# Patient Record
Sex: FEMALE | URBAN - METROPOLITAN AREA
[De-identification: names, ages, dates, MRNs, and addresses within clinical notes are randomized per-mention and may not be internally consistent; named-entity substitution may affect disease eponyms.]

---

## 2017-07-11 ENCOUNTER — HISTORICAL (OUTPATIENT)
Dept: ADMINISTRATIVE | Facility: HOSPITAL | Age: 57
End: 2017-07-11

## 2017-07-11 LAB
COLOR STL: NORMAL
CONSISTENCY STL: NORMAL
HEMOCCULT SP1 STL QL: NEGATIVE
HEMOCCULT SP2 STL QL: NEGATIVE

## 2017-07-31 ENCOUNTER — HISTORICAL (OUTPATIENT)
Dept: INTERNAL MEDICINE | Facility: CLINIC | Age: 57
End: 2017-07-31

## 2017-07-31 LAB
ABS NEUT (OLG): 1.46 X10(3)/MCL (ref 2.1–9.2)
APPEARANCE, UA: CLEAR
BACTERIA #/AREA URNS AUTO: ABNORMAL /[HPF]
BASOPHILS NFR BLD MANUAL: 2 %
BILIRUB UR QL STRIP: NEGATIVE
BUN SERPL-MCNC: 10 MG/DL (ref 7–18)
CALCIUM SERPL-MCNC: 9.2 MG/DL (ref 8.5–10.1)
CHLORIDE SERPL-SCNC: 108 MMOL/L (ref 98–107)
CO2 SERPL-SCNC: 34 MMOL/L (ref 21–32)
COLOR UR: YELLOW
CREAT SERPL-MCNC: 1 MG/DL (ref 0.6–1.3)
CREAT UR-MCNC: 352 MG/DL
EOSINOPHIL NFR BLD MANUAL: 0 %
ERYTHROCYTE [DISTWIDTH] IN BLOOD BY AUTOMATED COUNT: 13.2 % (ref 11.5–14.5)
GLUCOSE (UA): NORMAL
GLUCOSE SERPL-MCNC: 93 MG/DL (ref 74–106)
GRANULOCYTES NFR BLD MANUAL: 24 % (ref 43–75)
HCT VFR BLD AUTO: 41.9 % (ref 35–46)
HGB BLD-MCNC: 14 GM/DL (ref 12–16)
HGB UR QL STRIP: 0.1 MG/DL
HYALINE CASTS #/AREA URNS LPF: ABNORMAL /[LPF]
KETONES UR QL STRIP: NEGATIVE
LEUKOCYTE ESTERASE UR QL STRIP: NEGATIVE
LYMPHOCYTES NFR BLD MANUAL: 2 %
LYMPHOCYTES NFR BLD MANUAL: 64 % (ref 20.5–51.1)
MCH RBC QN AUTO: 29.8 PG (ref 26–34)
MCHC RBC AUTO-ENTMCNC: 33.4 GM/DL (ref 31–37)
MCV RBC AUTO: 89.1 FL (ref 80–100)
MICROALBUMIN UR-MCNC: 19.8 MG/L (ref 0–19)
MICROALBUMIN/CREAT RATIO PNL UR: 5.6 MCG/MG CR (ref 0–29)
MONOCYTES NFR BLD MANUAL: 4 % (ref 2–9)
NEUTS BAND NFR BLD MANUAL: 4 % (ref 0–10)
NITRITE UR QL STRIP: NEGATIVE
PH UR STRIP: 6 [PH] (ref 4.5–8)
PLATELET # BLD AUTO: 143 X10(3)/MCL (ref 130–400)
PLATELET # BLD EST: ADEQUATE 10*3/UL
PMV BLD AUTO: 11.3 FL (ref 7.4–10.4)
POTASSIUM SERPL-SCNC: 3.6 MMOL/L (ref 3.5–5.1)
PROT UR QL STRIP: 30 MG/DL
RBC # BLD AUTO: 4.7 X10(6)/MCL (ref 4–5.2)
RBC #/AREA URNS AUTO: ABNORMAL /[HPF]
RBC MORPH BLD: NORMAL
SODIUM SERPL-SCNC: 144 MMOL/L (ref 136–145)
SP GR UR STRIP: 1.02 (ref 1–1.03)
SQUAMOUS #/AREA URNS LPF: >100 /[LPF]
TSH SERPL-ACNC: 0.55 MIU/L (ref 0.36–3.74)
UROBILINOGEN UR STRIP-ACNC: 2 MG/DL
WBC # SPEC AUTO: 4.5 X10(3)/MCL (ref 4.5–11)
WBC #/AREA URNS AUTO: ABNORMAL /HPF

## 2017-08-01 ENCOUNTER — HISTORICAL (OUTPATIENT)
Dept: INTERNAL MEDICINE | Facility: CLINIC | Age: 57
End: 2017-08-01

## 2017-10-11 ENCOUNTER — HISTORICAL (OUTPATIENT)
Dept: ADMINISTRATIVE | Facility: HOSPITAL | Age: 57
End: 2017-10-11

## 2017-10-11 LAB
ABS NEUT (OLG): 2.14 X10(3)/MCL (ref 2.1–9.2)
ABS NEUT (OLG): 2.24 X10(3)/MCL (ref 2.1–9.2)
ALBUMIN SERPL-MCNC: 3.4 GM/DL (ref 3.4–5)
ALBUMIN/GLOB SERPL: 1 RATIO (ref 1–2)
ALP SERPL-CCNC: 75 UNIT/L (ref 45–117)
ALT SERPL-CCNC: 18 UNIT/L (ref 12–78)
AST SERPL-CCNC: 26 UNIT/L (ref 15–37)
BASOPHILS # BLD AUTO: 0.01 X10(3)/MCL
BASOPHILS # BLD AUTO: 0.01 X10(3)/MCL
BASOPHILS NFR BLD AUTO: 0 % (ref 0–1)
BASOPHILS NFR BLD AUTO: 0 % (ref 0–1)
BILIRUB SERPL-MCNC: 0.3 MG/DL (ref 0.2–1)
BILIRUBIN DIRECT+TOT PNL SERPL-MCNC: 0.1 MG/DL
BILIRUBIN DIRECT+TOT PNL SERPL-MCNC: 0.2 MG/DL
BUN SERPL-MCNC: 11 MG/DL (ref 7–18)
CALCIUM SERPL-MCNC: 9.1 MG/DL (ref 8.5–10.1)
CD3+CD4+ CELLS # SPEC: 178 UNIT/L (ref 589–1505)
CD3+CD4+ CELLS NFR BLD: 9 % (ref 31–59)
CHLORIDE SERPL-SCNC: 105 MMOL/L (ref 98–107)
CO2 SERPL-SCNC: 30 MMOL/L (ref 21–32)
CREAT SERPL-MCNC: 1 MG/DL (ref 0.6–1.3)
EOSINOPHIL # BLD AUTO: 0.08 10*3/UL
EOSINOPHIL # BLD AUTO: 0.09 10*3/UL
EOSINOPHIL NFR BLD AUTO: 2 % (ref 0–5)
EOSINOPHIL NFR BLD AUTO: 2 % (ref 0–5)
ERYTHROCYTE [DISTWIDTH] IN BLOOD BY AUTOMATED COUNT: 13.3 % (ref 11.5–14.5)
ERYTHROCYTE [DISTWIDTH] IN BLOOD BY AUTOMATED COUNT: 13.3 % (ref 11.5–14.5)
GLOBULIN SER-MCNC: 5.1 GM/ML (ref 2.3–3.5)
GLUCOSE SERPL-MCNC: 82 MG/DL (ref 74–106)
HCT VFR BLD AUTO: 39.9 % (ref 35–46)
HCT VFR BLD AUTO: 40.3 % (ref 35–46)
HGB BLD-MCNC: 13.3 GM/DL (ref 12–16)
HGB BLD-MCNC: 13.4 GM/DL (ref 12–16)
HIGH RISK HPV 16 (PRECISION): NEGATIVE
HIGH RISK HPV 18/45 (PRECISION): NEGATIVE
IMM GRANULOCYTES # BLD AUTO: 0.01 10*3/UL
IMM GRANULOCYTES NFR BLD AUTO: 0 %
LYMPHOCYTES # BLD AUTO: 1.92 X10(3)/MCL
LYMPHOCYTES # BLD AUTO: 1.98 X10(3)/MCL
LYMPHOCYTES # BLD AUTO: 1974 UNIT/L (ref 1260–5520)
LYMPHOCYTES NFR BLD AUTO: 42 % (ref 15–40)
LYMPHOCYTES NFR BLD AUTO: 43 % (ref 15–40)
LYMPHOCYTES NFR LN MANUAL: 42 % (ref 28–48)
LYMPHOMA - T-CELL MARKERS SPEC-IMP: ABNORMAL
MCH RBC QN AUTO: 30.5 PG (ref 26–34)
MCH RBC QN AUTO: 30.5 PG (ref 26–34)
MCHC RBC AUTO-ENTMCNC: 33.3 GM/DL (ref 31–37)
MCHC RBC AUTO-ENTMCNC: 33.3 GM/DL (ref 31–37)
MCV RBC AUTO: 91.5 FL (ref 80–100)
MCV RBC AUTO: 91.8 FL (ref 80–100)
MONOCYTES # BLD AUTO: 0.34 X10(3)/MCL
MONOCYTES # BLD AUTO: 0.37 X10(3)/MCL
MONOCYTES NFR BLD AUTO: 8 % (ref 4–12)
MONOCYTES NFR BLD AUTO: 8 % (ref 4–12)
NEUTROPHILS # BLD AUTO: 2.14 X10(3)/MCL
NEUTROPHILS # BLD AUTO: 2.24 X10(3)/MCL
NEUTROPHILS NFR BLD AUTO: 48 X10(3)/MCL
NEUTROPHILS NFR BLD AUTO: 48 X10(3)/MCL
PAP RECOMMENDATION EXT: ABNORMAL
PAP SMEAR: ABNORMAL
PLATELET # BLD AUTO: 142 X10(3)/MCL (ref 130–400)
PLATELET # BLD AUTO: 147 X10(3)/MCL (ref 130–400)
PMV BLD AUTO: 11.5 FL (ref 7.4–10.4)
PMV BLD AUTO: 12 FL (ref 7.4–10.4)
POTASSIUM SERPL-SCNC: 3.8 MMOL/L (ref 3.5–5.1)
PROT SERPL-MCNC: 8.5 GM/DL (ref 6.4–8.2)
RBC # BLD AUTO: 4.36 X10(6)/MCL (ref 4–5.2)
RBC # BLD AUTO: 4.39 X10(6)/MCL (ref 4–5.2)
SODIUM SERPL-SCNC: 141 MMOL/L (ref 136–145)
WBC # BLD AUTO: 4700 /MM3 (ref 4500–11500)
WBC # SPEC AUTO: 4.5 X10(3)/MCL (ref 4.5–11)
WBC # SPEC AUTO: 4.7 X10(3)/MCL (ref 4.5–11)

## 2017-11-20 ENCOUNTER — HISTORICAL (OUTPATIENT)
Dept: ADMINISTRATIVE | Facility: HOSPITAL | Age: 57
End: 2017-11-20

## 2017-11-20 LAB
ABS NEUT (OLG): 1.25 X10(3)/MCL (ref 2.1–9.2)
ALBUMIN SERPL-MCNC: 3.7 GM/DL (ref 3.4–5)
ALBUMIN/GLOB SERPL: 1 RATIO (ref 1–2)
ALP SERPL-CCNC: 75 UNIT/L (ref 45–117)
ALT SERPL-CCNC: 20 UNIT/L (ref 12–78)
AST SERPL-CCNC: 24 UNIT/L (ref 15–37)
BASOPHILS # BLD AUTO: 0.02 X10(3)/MCL
BASOPHILS NFR BLD AUTO: 0 % (ref 0–1)
BILIRUB SERPL-MCNC: 0.5 MG/DL (ref 0.2–1)
BILIRUBIN DIRECT+TOT PNL SERPL-MCNC: 0.1 MG/DL
BILIRUBIN DIRECT+TOT PNL SERPL-MCNC: 0.4 MG/DL
BUN SERPL-MCNC: 10 MG/DL (ref 7–18)
CALCIUM SERPL-MCNC: 9.5 MG/DL (ref 8.5–10.1)
CHLORIDE SERPL-SCNC: 107 MMOL/L (ref 98–107)
CO2 SERPL-SCNC: 33 MMOL/L (ref 21–32)
CREAT SERPL-MCNC: 1 MG/DL (ref 0.6–1.3)
EOSINOPHIL # BLD AUTO: 0.03 X10(3)/MCL
EOSINOPHIL NFR BLD AUTO: 1 % (ref 0–5)
ERYTHROCYTE [DISTWIDTH] IN BLOOD BY AUTOMATED COUNT: 13.1 % (ref 11.5–14.5)
GLOBULIN SER-MCNC: 5.3 GM/ML (ref 2.3–3.5)
GLUCOSE SERPL-MCNC: 77 MG/DL (ref 74–106)
HCT VFR BLD AUTO: 42.9 % (ref 35–46)
HGB BLD-MCNC: 14.6 GM/DL (ref 12–16)
LYMPHOCYTES # BLD AUTO: 2.38 X10(3)/MCL
LYMPHOCYTES NFR BLD AUTO: 59 % (ref 15–40)
MCH RBC QN AUTO: 30.9 PG (ref 26–34)
MCHC RBC AUTO-ENTMCNC: 34 GM/DL (ref 31–37)
MCV RBC AUTO: 90.9 FL (ref 80–100)
MONOCYTES # BLD AUTO: 0.35 X10(3)/MCL
MONOCYTES NFR BLD AUTO: 9 % (ref 4–12)
NEUTROPHILS # BLD AUTO: 1.25 X10(3)/MCL
NEUTROPHILS NFR BLD AUTO: 31 X10(3)/MCL
PLATELET # BLD AUTO: 132 X10(3)/MCL (ref 130–400)
PMV BLD AUTO: 11.3 FL (ref 7.4–10.4)
POTASSIUM SERPL-SCNC: 4.2 MMOL/L (ref 3.5–5.1)
PROT SERPL-MCNC: 9 GM/DL (ref 6.4–8.2)
RBC # BLD AUTO: 4.72 X10(6)/MCL (ref 4–5.2)
SODIUM SERPL-SCNC: 142 MMOL/L (ref 136–145)
WBC # SPEC AUTO: 4 X10(3)/MCL (ref 4.5–11)

## 2018-01-12 LAB — POC BETA-HCG (QUAL): NEGATIVE

## 2018-06-19 ENCOUNTER — HISTORICAL (OUTPATIENT)
Dept: ADMINISTRATIVE | Facility: HOSPITAL | Age: 58
End: 2018-06-19

## 2018-06-19 LAB
ABS NEUT (OLG): 1.08 X10(3)/MCL (ref 2.1–9.2)
ABS NEUT (OLG): 1.14 X10(3)/MCL (ref 2.1–9.2)
ALBUMIN SERPL-MCNC: 3.4 GM/DL (ref 3.4–5)
ALBUMIN/GLOB SERPL: 1 RATIO (ref 1–2)
ALP SERPL-CCNC: 68 UNIT/L (ref 45–117)
ALT SERPL-CCNC: 24 UNIT/L (ref 12–78)
AST SERPL-CCNC: 26 UNIT/L (ref 15–37)
BASOPHILS NFR BLD MANUAL: 0 %
BASOPHILS NFR BLD MANUAL: 0 %
BILIRUB SERPL-MCNC: 0.3 MG/DL (ref 0.2–1)
BILIRUBIN DIRECT+TOT PNL SERPL-MCNC: <0.1
BILIRUBIN DIRECT+TOT PNL SERPL-MCNC: ABNORMAL MG/DL
BUN SERPL-MCNC: 10 MG/DL (ref 7–18)
CALCIUM SERPL-MCNC: 9.4 MG/DL (ref 8.5–10.1)
CD3+CD4+ CELLS # SPEC: 153 UNIT/L (ref 589–1505)
CD3+CD4+ CELLS NFR BLD: 10.1 % (ref 31–59)
CHLORIDE SERPL-SCNC: 108 MMOL/L (ref 98–107)
CHOLEST SERPL-MCNC: 153 MG/DL
CHOLEST/HDLC SERPL: 4.4 {RATIO} (ref 0–4.4)
CO2 SERPL-SCNC: 32 MMOL/L (ref 21–32)
CREAT SERPL-MCNC: 1.1 MG/DL (ref 0.6–1.3)
EOSINOPHIL NFR BLD MANUAL: 1 %
EOSINOPHIL NFR BLD MANUAL: 3 %
ERYTHROCYTE [DISTWIDTH] IN BLOOD BY AUTOMATED COUNT: 12.7 % (ref 11.5–14.5)
ERYTHROCYTE [DISTWIDTH] IN BLOOD BY AUTOMATED COUNT: 12.7 % (ref 11.5–14.5)
EST. AVERAGE GLUCOSE BLD GHB EST-MCNC: 128 MG/DL
GLOBULIN SER-MCNC: 4.6 GM/ML (ref 2.3–3.5)
GLUCOSE SERPL-MCNC: 145 MG/DL (ref 74–106)
GRANULOCYTES NFR BLD MANUAL: 33 % (ref 43–75)
GRANULOCYTES NFR BLD MANUAL: 40 % (ref 43–75)
HBA1C MFR BLD: 6.1 % (ref 4.2–6.3)
HCT VFR BLD AUTO: 43.8 % (ref 35–46)
HCT VFR BLD AUTO: 44.8 % (ref 35–46)
HDLC SERPL-MCNC: 35 MG/DL
HGB BLD-MCNC: 14.4 GM/DL (ref 12–16)
HGB BLD-MCNC: 14.4 GM/DL (ref 12–16)
LDLC SERPL CALC-MCNC: 84 MG/DL (ref 0–130)
LYMPHOCYTES # BLD AUTO: 1519 UNIT/L (ref 1260–5520)
LYMPHOCYTES NFR BLD MANUAL: 3 %
LYMPHOCYTES NFR BLD MANUAL: 4 %
LYMPHOCYTES NFR BLD MANUAL: 49 % (ref 20.5–51.1)
LYMPHOCYTES NFR BLD MANUAL: 56 % (ref 20.5–51.1)
LYMPHOCYTES NFR LN MANUAL: 49 % (ref 28–48)
LYMPHOMA - T-CELL MARKERS SPEC-IMP: ABNORMAL
MCH RBC QN AUTO: 29.8 PG (ref 26–34)
MCH RBC QN AUTO: 30.6 PG (ref 26–34)
MCHC RBC AUTO-ENTMCNC: 32.1 GM/DL (ref 31–37)
MCHC RBC AUTO-ENTMCNC: 32.9 GM/DL (ref 31–37)
MCV RBC AUTO: 92.6 FL (ref 80–100)
MCV RBC AUTO: 93 FL (ref 80–100)
MONOCYTES NFR BLD MANUAL: 5 % (ref 2–9)
MONOCYTES NFR BLD MANUAL: 6 % (ref 2–9)
PLATELET # BLD AUTO: 175 X10(3)/MCL (ref 130–400)
PLATELET # BLD AUTO: 176 X10(3)/MCL (ref 130–400)
PLATELET # BLD EST: ADEQUATE 10*3/UL
PLATELET # BLD EST: ADEQUATE 10*3/UL
PMV BLD AUTO: 11.6 FL (ref 7.4–10.4)
PMV BLD AUTO: 11.8 FL (ref 7.4–10.4)
POTASSIUM SERPL-SCNC: 3.9 MMOL/L (ref 3.5–5.1)
PROT SERPL-MCNC: 8 GM/DL (ref 6.4–8.2)
RBC # BLD AUTO: 4.71 X10(6)/MCL (ref 4–5.2)
RBC # BLD AUTO: 4.84 X10(6)/MCL (ref 4–5.2)
RBC MORPH BLD: NORMAL
RBC MORPH BLD: NORMAL
SODIUM SERPL-SCNC: 142 MMOL/L (ref 136–145)
TRIGL SERPL-MCNC: 171 MG/DL
TSH SERPL-ACNC: 0.73 MIU/L (ref 0.36–3.74)
VLDLC SERPL CALC-MCNC: 34 MG/DL
WBC # BLD AUTO: 3100 /MM3 (ref 4500–11500)
WBC # SPEC AUTO: 3.1 X10(3)/MCL (ref 4.5–11)
WBC # SPEC AUTO: 3.4 X10(3)/MCL (ref 4.5–11)

## 2018-10-17 LAB
COLOR STL: NORMAL
CONSISTENCY STL: NORMAL
HEMOCCULT SP1 STL QL: NEGATIVE

## 2018-10-18 LAB
COLOR STL: NORMAL
CONSISTENCY STL: NORMAL
HEMOCCULT SP2 STL QL: NEGATIVE

## 2018-10-19 ENCOUNTER — HISTORICAL (OUTPATIENT)
Dept: ADMINISTRATIVE | Facility: HOSPITAL | Age: 58
End: 2018-10-19

## 2018-10-19 ENCOUNTER — HISTORICAL (OUTPATIENT)
Dept: INTERNAL MEDICINE | Facility: CLINIC | Age: 58
End: 2018-10-19

## 2018-10-19 LAB
ABS NEUT (OLG): 1.47 X10(3)/MCL (ref 2.1–9.2)
ALBUMIN SERPL-MCNC: 3.4 GM/DL (ref 3.4–5)
ALBUMIN/GLOB SERPL: 1 RATIO (ref 1–2)
ALP SERPL-CCNC: 71 UNIT/L (ref 45–117)
ALT SERPL-CCNC: 25 UNIT/L (ref 12–78)
AST SERPL-CCNC: 30 UNIT/L (ref 15–37)
BASOPHILS # BLD AUTO: 0.02 X10(3)/MCL
BASOPHILS NFR BLD AUTO: 1 %
BILIRUB SERPL-MCNC: 0.3 MG/DL (ref 0.2–1)
BILIRUBIN DIRECT+TOT PNL SERPL-MCNC: 0.1 MG/DL
BILIRUBIN DIRECT+TOT PNL SERPL-MCNC: 0.2 MG/DL
BUN SERPL-MCNC: 8 MG/DL (ref 7–18)
CALCIUM SERPL-MCNC: 9.1 MG/DL (ref 8.5–10.1)
CD3+CD4+ CELLS # SPEC: 173 UNIT/L (ref 589–1505)
CD3+CD4+ CELLS NFR BLD: 10.1 % (ref 31–59)
CHLORIDE SERPL-SCNC: 108 MMOL/L (ref 98–107)
CO2 SERPL-SCNC: 32 MMOL/L (ref 21–32)
COLOR STL: NORMAL
CONSISTENCY STL: NORMAL
CREAT SERPL-MCNC: 0.9 MG/DL (ref 0.6–1.3)
EOSINOPHIL # BLD AUTO: 0.07 X10(3)/MCL
EOSINOPHIL NFR BLD AUTO: 2 %
ERYTHROCYTE [DISTWIDTH] IN BLOOD BY AUTOMATED COUNT: 13.3 % (ref 11.5–14.5)
GLOBULIN SER-MCNC: 5.3 GM/ML (ref 2.3–3.5)
GLUCOSE SERPL-MCNC: 78 MG/DL (ref 74–106)
HCT VFR BLD AUTO: 43.4 % (ref 35–46)
HGB BLD-MCNC: 14.4 GM/DL (ref 12–16)
LYMPHOCYTES # BLD AUTO: 1.69 X10(3)/MCL
LYMPHOCYTES # BLD AUTO: 1715 UNIT/L (ref 1260–5520)
LYMPHOCYTES NFR BLD AUTO: 49 % (ref 13–40)
LYMPHOCYTES NFR LN MANUAL: 49 % (ref 28–48)
LYMPHOMA - T-CELL MARKERS SPEC-IMP: ABNORMAL
MCH RBC QN AUTO: 30.4 PG (ref 26–34)
MCHC RBC AUTO-ENTMCNC: 33.2 GM/DL (ref 31–37)
MCV RBC AUTO: 91.8 FL (ref 80–100)
MONOCYTES # BLD AUTO: 0.23 X10(3)/MCL
MONOCYTES NFR BLD AUTO: 7 % (ref 4–12)
NEUTROPHILS # BLD AUTO: 1.47 X10(3)/MCL
NEUTROPHILS NFR BLD AUTO: 42 X10(3)/MCL
PLATELET # BLD AUTO: 129 X10(3)/MCL (ref 130–400)
PMV BLD AUTO: 11.4 FL (ref 7.4–10.4)
POTASSIUM SERPL-SCNC: 3.9 MMOL/L (ref 3.5–5.1)
PROT SERPL-MCNC: 8.7 GM/DL (ref 6.4–8.2)
RBC # BLD AUTO: 4.73 X10(6)/MCL (ref 4–5.2)
SODIUM SERPL-SCNC: 143 MMOL/L (ref 136–145)
WBC # BLD AUTO: 3500 /MM3 (ref 4500–11500)
WBC # SPEC AUTO: 3.5 X10(3)/MCL (ref 4.5–11)

## 2018-10-26 ENCOUNTER — HISTORICAL (OUTPATIENT)
Dept: RADIOLOGY | Facility: HOSPITAL | Age: 58
End: 2018-10-26

## 2019-04-15 ENCOUNTER — HISTORICAL (OUTPATIENT)
Dept: ADMINISTRATIVE | Facility: HOSPITAL | Age: 59
End: 2019-04-15

## 2019-04-15 LAB
ABS NEUT (OLG): 1.3 X10(3)/MCL (ref 2.1–9.2)
ALBUMIN SERPL-MCNC: 3.3 GM/DL (ref 3.4–5)
ALBUMIN/GLOB SERPL: 0.7 RATIO (ref 1.1–2)
ALP SERPL-CCNC: 64 UNIT/L (ref 45–117)
ALT SERPL-CCNC: 17 UNIT/L (ref 12–78)
AST SERPL-CCNC: 27 UNIT/L (ref 15–37)
BASOPHILS # BLD AUTO: 0.02 X10(3)/MCL
BASOPHILS NFR BLD AUTO: 1 %
BILIRUB SERPL-MCNC: 0.2 MG/DL (ref 0.2–1)
BILIRUBIN DIRECT+TOT PNL SERPL-MCNC: <0.1 MG/DL
BILIRUBIN DIRECT+TOT PNL SERPL-MCNC: ABNORMAL MG/DL
BUN SERPL-MCNC: 14 MG/DL (ref 7–18)
CALCIUM SERPL-MCNC: 9.3 MG/DL (ref 8.5–10.1)
CD3+CD4+ CELLS # SPEC: 115 UNIT/L (ref 589–1505)
CD3+CD4+ CELLS NFR BLD: 8.8 % (ref 31–59)
CHLORIDE SERPL-SCNC: 106 MMOL/L (ref 98–107)
CO2 SERPL-SCNC: 33 MMOL/L (ref 21–32)
CREAT SERPL-MCNC: 1 MG/DL (ref 0.6–1.3)
EOSINOPHIL # BLD AUTO: 0.24 X10(3)/MCL
EOSINOPHIL NFR BLD AUTO: 8 %
ERYTHROCYTE [DISTWIDTH] IN BLOOD BY AUTOMATED COUNT: 12.8 % (ref 11.5–14.5)
GLOBULIN SER-MCNC: 4.9 GM/ML (ref 2.3–3.5)
GLUCOSE SERPL-MCNC: 75 MG/DL (ref 74–106)
HCT VFR BLD AUTO: 40.5 % (ref 35–46)
HGB BLD-MCNC: 13.3 GM/DL (ref 12–16)
LYMPHOCYTES # BLD AUTO: 1.31 X10(3)/MCL
LYMPHOCYTES # BLD AUTO: 1302 UNIT/L (ref 1260–5520)
LYMPHOCYTES NFR BLD AUTO: 42 % (ref 13–40)
LYMPHOCYTES NFR LN MANUAL: 42 % (ref 28–48)
LYMPHOMA - T-CELL MARKERS SPEC-IMP: ABNORMAL
MCH RBC QN AUTO: 29.9 PG (ref 26–34)
MCHC RBC AUTO-ENTMCNC: 32.8 GM/DL (ref 31–37)
MCV RBC AUTO: 91 FL (ref 80–100)
MONOCYTES # BLD AUTO: 0.24 X10(3)/MCL
MONOCYTES NFR BLD AUTO: 8 % (ref 4–12)
NEUTROPHILS # BLD AUTO: 1.3 X10(3)/MCL
NEUTROPHILS NFR BLD AUTO: 42 X10(3)/MCL
PLATELET # BLD AUTO: 151 X10(3)/MCL (ref 130–400)
PMV BLD AUTO: 11.8 FL (ref 7.4–10.4)
POTASSIUM SERPL-SCNC: 3.8 MMOL/L (ref 3.5–5.1)
PROT SERPL-MCNC: 8.2 GM/DL (ref 6.4–8.2)
RBC # BLD AUTO: 4.45 X10(6)/MCL (ref 4–5.2)
SODIUM SERPL-SCNC: 141 MMOL/L (ref 136–145)
T PALLIDUM AB SER QL: NONREACTIVE
WBC # BLD AUTO: 3100 /MM3 (ref 4500–11500)
WBC # SPEC AUTO: 3.1 X10(3)/MCL (ref 4.5–11)

## 2019-04-16 LAB — GRAM STN SPEC: NORMAL

## 2019-06-05 ENCOUNTER — HISTORICAL (OUTPATIENT)
Dept: ADMINISTRATIVE | Facility: HOSPITAL | Age: 59
End: 2019-06-05

## 2019-06-05 LAB
ABS NEUT (OLG): 1.54 X10(3)/MCL (ref 2.1–9.2)
APPEARANCE, UA: CLEAR
BACTERIA #/AREA URNS AUTO: ABNORMAL /[HPF]
BASOPHILS # BLD AUTO: 0.02 X10(3)/MCL
BASOPHILS NFR BLD AUTO: 1 %
BILIRUB UR QL STRIP: NEGATIVE
CD3+CD4+ CELLS # SPEC: 127 UNIT/L (ref 589–1505)
CD3+CD4+ CELLS NFR BLD: 8.5 % (ref 31–59)
CHOLEST SERPL-MCNC: 157 MG/DL
CHOLEST/HDLC SERPL: 3.6 {RATIO} (ref 0–4.4)
COLOR UR: YELLOW
EOSINOPHIL # BLD AUTO: 0.04 10*3/UL
EOSINOPHIL NFR BLD AUTO: 1 %
ERYTHROCYTE [DISTWIDTH] IN BLOOD BY AUTOMATED COUNT: 13.2 % (ref 11.5–14.5)
EST. AVERAGE GLUCOSE BLD GHB EST-MCNC: 126 MG/DL
GLUCOSE (UA): NORMAL
HAV AB SER QL IA: REACTIVE
HAV IGM SERPL QL IA: NONREACTIVE
HBA1C MFR BLD: 6 % (ref 4.2–6.3)
HBV CORE IGM SERPL QL IA: NONREACTIVE
HBV SURFACE AB SER-ACNC: <3.1 MIU/ML
HBV SURFACE AB SERPL IA-ACNC: NONREACTIVE M[IU]/ML
HBV SURFACE AG SERPL QL IA: NEGATIVE
HCT VFR BLD AUTO: 42.5 % (ref 35–46)
HCV AB SERPL QL IA: NONREACTIVE
HDLC SERPL-MCNC: 44 MG/DL
HGB BLD-MCNC: 13.6 GM/DL (ref 12–16)
HGB UR QL STRIP: NEGATIVE
HYALINE CASTS #/AREA URNS LPF: ABNORMAL /[LPF]
IMM GRANULOCYTES # BLD AUTO: 0.01 10*3/UL
IMM GRANULOCYTES NFR BLD AUTO: 0 %
KETONES UR QL STRIP: NEGATIVE
LDLC SERPL CALC-MCNC: 95 MG/DL (ref 0–130)
LEUKOCYTE ESTERASE UR QL STRIP: NEGATIVE
LYMPHOCYTES # BLD AUTO: 1.5 X10(3)/MCL
LYMPHOCYTES # BLD AUTO: 1496 UNIT/L (ref 1260–5520)
LYMPHOCYTES NFR BLD AUTO: 44 % (ref 13–40)
LYMPHOCYTES NFR LN MANUAL: 44 % (ref 28–48)
LYMPHOMA - T-CELL MARKERS SPEC-IMP: ABNORMAL
MCH RBC QN AUTO: 28.8 PG (ref 26–34)
MCHC RBC AUTO-ENTMCNC: 32 GM/DL (ref 31–37)
MCV RBC AUTO: 90 FL (ref 80–100)
MONOCYTES # BLD AUTO: 0.26 X10(3)/MCL
MONOCYTES NFR BLD AUTO: 8 % (ref 4–12)
NEG CONT SPOT COUNT: NORMAL
NEUTROPHILS # BLD AUTO: 1.54 X10(3)/MCL
NEUTROPHILS NFR BLD AUTO: 46 X10(3)/MCL
NITRITE UR QL STRIP: NEGATIVE
PANEL A SPOT COUNT: 0
PANEL B SPOT COUNT: 0
PAP RECOMMENDATION EXT: ABNORMAL
PAP SMEAR: ABNORMAL
PH UR STRIP: 6.5 [PH] (ref 4.5–8)
PLATELET # BLD AUTO: 144 X10(3)/MCL (ref 130–400)
PMV BLD AUTO: 11.6 FL (ref 7.4–10.4)
POS CONT SPOT COUNT: NORMAL
PROT UR QL STRIP: 20 MG/DL
RBC # BLD AUTO: 4.72 X10(6)/MCL (ref 4–5.2)
RBC #/AREA URNS AUTO: ABNORMAL /[HPF]
SP GR UR STRIP: 1.02 (ref 1–1.03)
SQUAMOUS #/AREA URNS LPF: >100 /[LPF]
T PALLIDUM AB SER QL: NONREACTIVE
T-SPOT.TB: NORMAL
TRIGL SERPL-MCNC: 89 MG/DL
TSH SERPL-ACNC: 0.64 MIU/L (ref 0.36–3.74)
UROBILINOGEN UR STRIP-ACNC: 2 MG/DL
VLDLC SERPL CALC-MCNC: 18 MG/DL
WBC # BLD AUTO: 3400 /MM3 (ref 4500–11500)
WBC # SPEC AUTO: 3.4 X10(3)/MCL (ref 4.5–11)
WBC #/AREA URNS AUTO: ABNORMAL /HPF

## 2019-06-07 LAB
FINAL CULTURE: NORMAL
GRAM STN SPEC: NORMAL

## 2019-06-18 ENCOUNTER — HISTORICAL (OUTPATIENT)
Dept: RADIOLOGY | Facility: HOSPITAL | Age: 59
End: 2019-06-18

## 2019-09-06 LAB — POC BETA-HCG (QUAL): NEGATIVE

## 2019-09-12 ENCOUNTER — HISTORICAL (OUTPATIENT)
Dept: ADMINISTRATIVE | Facility: HOSPITAL | Age: 59
End: 2019-09-12

## 2019-09-12 LAB
ALBUMIN SERPL-MCNC: 3.4 GM/DL (ref 3.4–5)
ALBUMIN/GLOB SERPL: 0.6 RATIO (ref 1.1–2)
ALP SERPL-CCNC: 84 UNIT/L (ref 45–117)
ALT SERPL-CCNC: 25 UNIT/L (ref 12–78)
AST SERPL-CCNC: 39 UNIT/L (ref 15–37)
BASOPHILS NFR BLD MANUAL: 0 %
BILIRUB SERPL-MCNC: 0.4 MG/DL (ref 0.2–1)
BILIRUBIN DIRECT+TOT PNL SERPL-MCNC: 0.1 MG/DL (ref 0–0.2)
BILIRUBIN DIRECT+TOT PNL SERPL-MCNC: 0.3 MG/DL
BUN SERPL-MCNC: 12 MG/DL (ref 7–18)
CALCIUM SERPL-MCNC: 8.8 MG/DL (ref 8.5–10.1)
CD3+CD4+ CELLS # SPEC: 141 UNIT/L (ref 589–1505)
CD3+CD4+ CELLS NFR BLD: 9.6 % (ref 31–59)
CHLORIDE SERPL-SCNC: 104 MMOL/L (ref 98–107)
CO2 SERPL-SCNC: 34 MMOL/L (ref 21–32)
CREAT SERPL-MCNC: 0.9 MG/DL (ref 0.6–1.3)
EOSINOPHIL NFR BLD MANUAL: 0 %
ERYTHROCYTE [DISTWIDTH] IN BLOOD BY AUTOMATED COUNT: 13.4 % (ref 11.5–14.5)
GLOBULIN SER-MCNC: 5.6 GM/ML (ref 2.3–3.5)
GLUCOSE SERPL-MCNC: 66 MG/DL (ref 74–106)
GRANULOCYTES NFR BLD MANUAL: 46 % (ref 43–75)
HCT VFR BLD AUTO: 42.9 % (ref 35–46)
HGB BLD-MCNC: 13.9 GM/DL (ref 12–16)
LYMPHOCYTES # BLD AUTO: 1472 UNIT/L (ref 1260–5520)
LYMPHOCYTES NFR BLD MANUAL: 1 %
LYMPHOCYTES NFR BLD MANUAL: 46 % (ref 20.5–51.1)
LYMPHOCYTES NFR LN MANUAL: 46 % (ref 28–48)
LYMPHOMA - T-CELL MARKERS SPEC-IMP: ABNORMAL
MCH RBC QN AUTO: 29.8 PG (ref 26–34)
MCHC RBC AUTO-ENTMCNC: 32.4 GM/DL (ref 31–37)
MCV RBC AUTO: 91.9 FL (ref 80–100)
MONOCYTES NFR BLD MANUAL: 5 % (ref 2–9)
NEUTS BAND NFR BLD MANUAL: 2 % (ref 0–10)
PLATELET # BLD AUTO: 175 X10(3)/MCL (ref 130–400)
PLATELET # BLD EST: ADEQUATE 10*3/UL
PMV BLD AUTO: 11.5 FL (ref 7.4–10.4)
POTASSIUM SERPL-SCNC: 3 MMOL/L (ref 3.5–5.1)
PROT SERPL-MCNC: 9 GM/DL (ref 6.4–8.2)
RBC # BLD AUTO: 4.67 X10(6)/MCL (ref 4–5.2)
RBC MORPH BLD: NORMAL
SODIUM SERPL-SCNC: 140 MMOL/L (ref 136–145)
WBC # BLD AUTO: 3200 /MM3 (ref 4500–11500)
WBC # SPEC AUTO: 3.2 X10(3)/MCL (ref 4.5–11)

## 2019-09-24 ENCOUNTER — HISTORICAL (OUTPATIENT)
Dept: ADMINISTRATIVE | Facility: HOSPITAL | Age: 59
End: 2019-09-24

## 2019-10-08 ENCOUNTER — HISTORICAL (OUTPATIENT)
Dept: RADIOLOGY | Facility: HOSPITAL | Age: 59
End: 2019-10-08

## 2019-10-16 ENCOUNTER — HISTORICAL (OUTPATIENT)
Dept: ADMINISTRATIVE | Facility: HOSPITAL | Age: 59
End: 2019-10-16

## 2019-10-17 ENCOUNTER — HISTORICAL (OUTPATIENT)
Dept: SURGERY | Facility: HOSPITAL | Age: 59
End: 2019-10-17

## 2020-02-11 ENCOUNTER — HISTORICAL (OUTPATIENT)
Dept: ADMINISTRATIVE | Facility: HOSPITAL | Age: 60
End: 2020-02-11

## 2020-02-11 LAB
ABS NEUT (OLG): 1.46 X10(3)/MCL (ref 2.1–9.2)
ALBUMIN SERPL-MCNC: 2.9 GM/DL (ref 3.4–5)
ALBUMIN/GLOB SERPL: 0.5 RATIO (ref 1.1–2)
ALP SERPL-CCNC: 74 UNIT/L (ref 45–117)
ALT SERPL-CCNC: 17 UNIT/L (ref 12–78)
AST SERPL-CCNC: 27 UNIT/L (ref 15–37)
BASOPHILS NFR BLD MANUAL: 0 %
BILIRUB SERPL-MCNC: 0.2 MG/DL (ref 0.2–1)
BILIRUBIN DIRECT+TOT PNL SERPL-MCNC: 0.1 MG/DL
BILIRUBIN DIRECT+TOT PNL SERPL-MCNC: 0.1 MG/DL (ref 0–0.2)
BUN SERPL-MCNC: 12 MG/DL (ref 7–18)
CALCIUM SERPL-MCNC: 9 MG/DL (ref 8.5–10.1)
CD3+CD4+ CELLS # SPEC: 141 UNIT/L (ref 589–1505)
CD3+CD4+ CELLS NFR BLD: 8.9 % (ref 31–59)
CHLORIDE SERPL-SCNC: 100 MMOL/L (ref 98–107)
CO2 SERPL-SCNC: 34 MMOL/L (ref 21–32)
CREAT SERPL-MCNC: 0.9 MG/DL (ref 0.6–1.3)
EOSINOPHIL NFR BLD MANUAL: 1 %
ERYTHROCYTE [DISTWIDTH] IN BLOOD BY AUTOMATED COUNT: 12.7 % (ref 11.5–14.5)
GLOBULIN SER-MCNC: 6 GM/ML (ref 2.3–3.5)
GLUCOSE SERPL-MCNC: 92 MG/DL (ref 74–106)
GRANULOCYTES NFR BLD MANUAL: 45 % (ref 43–75)
HCT VFR BLD AUTO: 40.6 % (ref 35–46)
HGB BLD-MCNC: 13.1 GM/DL (ref 12–16)
LYMPHOCYTES # BLD AUTO: 1584 UNIT/L (ref 1260–5520)
LYMPHOCYTES NFR BLD MANUAL: 1 %
LYMPHOCYTES NFR BLD MANUAL: 43.8 % (ref 20.5–51.1)
LYMPHOCYTES NFR LN MANUAL: 44 % (ref 28–48)
LYMPHOMA - T-CELL MARKERS SPEC-IMP: ABNORMAL
MCH RBC QN AUTO: 28.9 PG (ref 26–34)
MCHC RBC AUTO-ENTMCNC: 32.3 GM/DL (ref 31–37)
MCV RBC AUTO: 89.4 FL (ref 80–100)
MONOCYTES NFR BLD MANUAL: 8 % (ref 2–9)
PLATELET # BLD AUTO: 230 X10(3)/MCL (ref 130–400)
PLATELET # BLD EST: ADEQUATE 10*3/UL
PMV BLD AUTO: 11.3 FL (ref 7.4–10.4)
POTASSIUM SERPL-SCNC: 4.2 MMOL/L (ref 3.5–5.1)
PROT SERPL-MCNC: 8.9 GM/DL (ref 6.4–8.2)
RBC # BLD AUTO: 4.54 X10(6)/MCL (ref 4–5.2)
RBC MORPH BLD: NORMAL
SODIUM SERPL-SCNC: 137 MMOL/L (ref 136–145)
WBC # BLD AUTO: 3600 /MM3 (ref 4500–11500)
WBC # SPEC AUTO: 3.6 X10(3)/MCL (ref 4.5–11)

## 2020-02-12 LAB — GRAM STN SPEC: NORMAL

## 2020-02-14 LAB — FINAL CULTURE: NORMAL

## 2022-04-10 ENCOUNTER — HISTORICAL (OUTPATIENT)
Dept: ADMINISTRATIVE | Facility: HOSPITAL | Age: 62
End: 2022-04-10

## 2022-04-28 VITALS
OXYGEN SATURATION: 100 % | SYSTOLIC BLOOD PRESSURE: 130 MMHG | BODY MASS INDEX: 20.82 KG/M2 | HEIGHT: 56 IN | HEIGHT: 56 IN | SYSTOLIC BLOOD PRESSURE: 106 MMHG | DIASTOLIC BLOOD PRESSURE: 80 MMHG | BODY MASS INDEX: 20.82 KG/M2 | WEIGHT: 92.56 LBS | DIASTOLIC BLOOD PRESSURE: 74 MMHG | WEIGHT: 92.56 LBS

## 2022-05-03 NOTE — HISTORICAL [facility] CERNER
This is a historical note converted from [facility]. Formatting and pictures may have been removed.  Please reference Cermark for original formatting and attached multimedia. Chief Complaint  b20 f/u  History of Present Illness  Zoraida is a 58 yo BF presenting today for HIV f/u visit.? She has been nonadherent to treatment plan for years now?& presents today for f/u visit.? She complains of left-sided chest pain x 2 weeks, worsened with eating.? She was seen in ED at [facility] last week for chest pain, states that she had an EKG, chest XR, & labs done that were all clear & was told to f/u with me.? Records requested.? She endorses food feeling like it gets stuck in esophagus, even soup, and all food tasting bitter.? She has a chronic cough which seems to have worsened.? Denies fever but endorses night sweats. Denies shortness of breath or dyspnea on exertion.? She has missed appointments to establish care with PCP on several occasions.? VSS, non-toxic in appearance.  ?  10/24/19  Ms. Gordillo is a 58 yo BF presenting today for HIV f/u visit.? Admits to still not taking meds as rxd.? Again states that today is the day that she will start taking meds as rxd.? BP elevated, states that she did take BP med this am but is known to be nonadherent to all treatment plans.?Denies HA, chest pain, SOB. ?Did attend GYN appt & had ECC as scheduled, overcoming fear of general anesthesia.? Scheduled for PCP appt 11/12/19 with plans to attend & establish care for management of comorbidities. Chronic cough?persists, newly dx with COPD.? Amenable to flu vax today.  ?   9/12/19  Rand is a 59 yo BF presenting today for HIV f/u visit.? Admits to not taking any meds as prescribed, continues to struggle with mental health concerns.? States that her 16 yo grandson passed away in a kayaking accident in [place] a few months ago.? C/o skin breakdown in corners of lips, thrush in mouth. Admits that she does have fluconazole at home in her  kevan, did not realize that it is for thrush.? Spent extensive amount of time stressing importance of taking meds as prescribed, verbalized understanding & intent to do so.? Acknowledges that we have had this conversation at every visit for several years, and that she does not follow through at home.? Expresses conviction to do so this time.? C/o chronic cough, did not attend CT of chest appointment as previously scheduled.? C/o frequent headaches, floaters, memory loss.? Agrees to attend appts for MRI of brain & CT of chest when scheduled.? Attended appts for colpo, mmg, & dexa 6/2019, reports reviewed.? BP above goal, but is not taking prescribed medications.? Strongly encouraged pt to seek  evaluation at [facility] for grief & depression, states that she will get her  at [facility] to assist.? She is non-toxic in appearance, and is aware of dire consequences of continued non-adherence to treatment plan recommendations.  ?  Review of Systems  ?  ?  Constitutional: negative except as stated in HPI  Eye: negative except as stated in HPI  ENMT: negative except as stated in HPI  Respiratory: negative except as stated in HPI  Cardiovascular: negative except as stated in HPI  Gastrointestinal: negative except as stated in HPI  Genitourinary: negative except as stated in HPI  Hema/Lymph: negative except as stated in HPI  Endocrine: negative except as stated in HPI  Immunologic: negative except as stated in HPI  Musculoskeletal: negative except as stated in HPI  Integumentary: negative except as stated in HPI  Neurologic: negative except as stated in HPI  ?   All Other ROS_ ?negative except as stated in HPI  Physical Exam  Vitals & Measurements  T:?36.8? ?C (Oral)? HR:?98(Peripheral)? RR:?16? BP:?106/74?  HT:?142?cm? WT:?42?kg? BMI:?20.83?  ?  ?  General: AAO X 4, afebrile  Eye: no icterus  HENT: oropharyngeal thrush noted to upper palate  Neck: supple  Respiratory: coarse rhonchi throughout Lt>RT, non-labored,  symmetrical, white productive mucus  Cardiovascular: S1S2, RRR  Gastrointestinal BS + 4 quadrants, NTND, soft, no organomegaly  Genitourinary: non-tender  Lymphatics:?bilat ant/post cervical?lymphadenopathy  Musculoskeletal: MAEW, steady gait  Integumentary: WDI  Neurologic: CN II-XII intact  Assessment/Plan  1.?HIV (human immunodeficiency virus infection)?B20  ?extensive adherence counseling done  counseled extensively on consequences of untreated HIV, voiced understanding  written list of crucial meds to include Biktarvy, Bactrim DS & fluconazole given to pt, states that she will start taking daily as prescribed  labs today  rtc 1 month w Mayuri  vaccinations deferred s/t immunocompromised state  request ED records [facility]  Ordered:  .Cd4 Lymphocytes, Routine collect, 02/11/20 13:30:00 CST, Blood, Stop date 02/11/20 13:30:00 CST, Lab Collect, HIV (human immunodeficiency virus infection), 02/11/20 13:30:00 CST  1160F- Medication reconciliation completed during visit, HIV (human immunodeficiency virus infection)  Cough  Dysphagia  Candidiasis of esophagus, [facility], 02/11/20 14:37:00 CST  Clinic Follow up, *Est. 03/11/20 3:00:00 CDT, Order for future visit, HIV (human immunodeficiency virus infection), [facility]  Gram Stain, Routine collect, 02/11/20 13:43:00 CST, Sputum, Nurse collect, Stop date 02/11/20 13:43:00 CST, Cough  HIV (human immunodeficiency virus infection)  Manual Diff, Now collect, 02/11/20 13:40:00 CST, Blood, Collected, Stop date 02/11/20 13:40:00 CST, Lab Collect, HIV (human immunodeficiency virus infection), 02/11/20 13:30:00 CST  Office/Outpatient Visit Level 4 Established 37738 PC, HIV (human immunodeficiency virus infection)  Cough  Dysphagia  Candidiasis of esophagus, [facility], 02/11/20 14:37:00 CST  Respiratory Culture, Routine collect, 02/11/20 13:43:00 CST, Sputum, Nurse collect, Stop date 02/11/20 13:43:00 CST, Cough  HIV (human immunodeficiency virus  infection)  RNA, PCR(NonGraph)rfx/GenoPRIme(R)-[facility] [id number], Routine collect, 02/11/20 13:30:00 CST, Blood, Stop date 02/11/20 13:30:00 CST, Lab Collect, HIV (human immunodeficiency virus infection), 02/11/20 13:30:00 CST  ?  2.?Cough?R05  ?CXR negative  sputum for gram stain & culture  cbc today  Ordered:  1160F- Medication reconciliation completed during visit, HIV (human immunodeficiency virus infection)  Cough  Dysphagia  Candidiasis of esophagus, [facility], 02/11/20 14:37:00 CST  Gram Stain, Routine collect, 02/11/20 13:43:00 CST, Sputum, Nurse collect, Stop date 02/11/20 13:43:00 CST, Cough  HIV (human immunodeficiency virus infection)  Office/Outpatient Visit Level 4 Established 30478 PC, HIV (human immunodeficiency virus infection)  Cough  Dysphagia  Candidiasis of esophagus, [facility], 02/11/20 14:37:00 CST  Respiratory Culture, Routine collect, 02/11/20 13:43:00 CST, Sputum, Nurse collect, Stop date 02/11/20 13:43:00 CST, Cough  HIV (human immunodeficiency virus infection)  ?  3.?Dysphagia?R13.10  likely esophageal candidiasis  ?fluconazole 200mg daily  if not resolved with fluconazole, will refer to GI for EGD  Ordered:  1160F- Medication reconciliation completed during visit, HIV (human immunodeficiency virus infection)  Cough  Dysphagia  Candidiasis of esophagus, [facility], 02/11/20 14:37:00 CST  Office/Outpatient Visit Level 4 Established 18546 PC, HIV (human immunodeficiency virus infection)  Cough  Dysphagia  Candidiasis of esophagus, [facility], 02/11/20 14:37:00 CST  ?  Orders:  bictegravir/emtricitabine/tenofovir, 1 tab(s), Oral, Daily, # 30 tab(s), 1 Refill(s), Pharmacy: [facility], 142, cm, Height/Length Dosing, 02/11/20 12:45:00 CST, 42, kg, Weight Dosing, 02/11/20 12:45:00 CST  fluconazole, 200 mg = 1 tab(s), Oral, Daily, # 30 tab(s), 1 Refill(s), Pharmacy: [facility] [place], 142, cm, Height/Length Dosing, 02/11/20 12:45:00 CST, 42, kg, Weight  Dosing, 02/11/20 12:45:00 CST  sulfamethoxazole-trimethoprim, 1 tab(s), Oral, Daily, # 30 tab(s), 1 Refill(s), [facility] [place] [place], 142, cm, Height/Length Dosing, 02/11/20 12:45:00 CST, 42, kg, Weight Dosing, 02/11/20 12:45:00 CST  Referrals  Clinic Follow up, *Est. 03/11/20 3:00:00 CDT, Order for future visit, HIV (human immunodeficiency virus infection), [facility]   Problem List/Past Medical History  Ongoing  Asthma  Cervical dysplasia  Depression  HIV (human immunodeficiency virus infection)  Hypertension  Osteopenia  Preop examination  Historical  Morbid obesity  Pregnant  Pregnant  Pregnant  Pregnant  Visit for screening mammogram  Vitamin D deficiency(  Confirmed  )  Well adult exam  Procedure/Surgical History  Cervical Conization (None) (10/17/2019)  Conization of cervix, with or without fulguration, with or without dilation and curettage, with or without repair; cold knife or laser (10/17/2019)  Excision of Cervix, Via Natural or Artificial Opening, Diagnostic (10/17/2019)  plastic surgery- face   Medications  albuterol 90 mcg/inh inhalation aerosol, 2 puff(s), Oral, q4-6hr, PRN, 6 refills  Biktarvy oral tablet, 1 tab(s), Oral, Daily, 1 refills  fluconazole 200 mg oral tablet, 200 mg= 1 tab(s), Oral, Daily, 1 refills  fluconazole 200 mg oral tablet, 200 mg= 1 tab(s), Oral, Daily, 1 refills  hydrochlorothiazide-lisinopril 25 mg-20 mg oral tablet, 1 tab(s), Oral, Daily, 3 refills  Monsels topical solution, 1 radha, TOP, Once  Norvasc 10 mg oral tablet, 10 mg= 1 tab(s), Oral, At Bedtime, 3 refills  paroxetine 20 mg oral tablet, 20 mg= 1 tab(s), Oral, Daily  sulfamethoxazole-trimethoprim 800 mg-160 mg oral tablet, 1 tab(s), Oral, Daily, 1 refills  Allergies  aspirin?(Burning)  Social History  Abuse/Neglect  No, No, Yes, 02/11/2020  Alcohol - Denies Alcohol Use, 08/15/2014  Past, 04/28/2016  Employment/School  Employed, 09/23/2019  Exercise  Exercise frequency: Daily. Self  assessment: Good condition. Exercise type: Walking., 01/12/2017  Home/Environment  Lives with Alone. Living situation: Home/Independent. Alcohol abuse in household: No. Substance abuse in household: No. Smoker in household: No. Injuries/Abuse/Neglect in household: No. Feels unsafe at home: No., 01/12/2017  Nutrition/Health  Regular, Low sodium, No added salt, Good, 09/23/2019  Regular, 04/28/2016  Sexual  Sexually active: No., 09/23/2019  Substance Use - Denies Substance Abuse, 08/15/2014  Current, Marijuana, 1-2 times per month, 01/11/2019  Tobacco - Denies Tobacco Use, 08/15/2014  Never (less than 100 in lifetime), N/A, 02/11/2020  Family History  Hypertension.: Mother, Sister and Brother.  Primary malignant neoplasm of breast: Mother.  Primary malignant neoplasm of prostate: Father.  Thyroid disease.: Mother.  Immunizations  Vaccine Date Status   influenza virus vaccine, inactivated 10/24/2019 Given   influenza virus vaccine, inactivated 10/19/2018 Given   influenza virus vaccine, inactivated 10/11/2017 Given   influenza virus vaccine, inactivated 11/09/2016 Given   influenza virus vaccine, inactivated 12/01/2015 Given   pneumococcal 23-polyvalent vaccine 12/01/2015 Given   tetanus/diphth/pertuss (Tdap) adult/adol 08/13/2014 Recorded   pneumococcal 13-valent conjugate vaccine 08/13/2014 Recorded   influenza virus vaccine, inactivated 11/22/2013 Recorded   influenza virus vaccine, inactivated 12/06/2011 Recorded   influenza virus vaccine, inactivated 12/10/2010 Recorded   pneumococcal 7-valent vaccine 12/10/2010 Recorded   pneumococcal 23-polyvalent vaccine 12/10/2010 Recorded   hepatitis A adult vaccine 10/05/2010 Recorded   measles virus vaccine 12/06/1967 Recorded   poliovirus vaccine, live, trivalent 08/15/1967 Recorded   Health Maintenance  Health Maintenance  ???Pending?(in the next year)  ??? ??OverDue  ??? ? ? ?COPD Maintenance-Spirometry due??and every?  ??? ? ? ?Diabetes Screening due??and every?  ??? ?  ? ?Asthma Management-Spirometry due??08/01/18??and every 1??year(s)  ??? ? ? ?Colorectal Screening due??10/19/19??and every 1??year(s)  ??? ??Due?  ??? ? ? ?Asthma Management-Asthma Education due??02/11/20??and every 6??month(s)  ??? ? ? ?Asthma Management-Wolff Peak Flow due??02/11/20??Variable frequency  ??? ? ? ?Asthma Management-Written Action Plan due??02/11/20??and every 6??month(s)  ??? ??Due In Future?  ??? ? ? ?ADL Screening not due until??06/05/20??and every 1??year(s)  ??? ? ? ?Alcohol Misuse Screening not due until??01/01/21??and every 1??year(s)  ??? ? ? ?Obesity Screening not due until??01/01/21??and every 1??year(s)  ??? ? ? ?Blood Pressure Screening not due until??02/10/21??and every 1??year(s)  ??? ? ? ?Body Mass Index Check not due until??02/10/21??and every 1??year(s)  ??? ? ? ?Hypertension Management-Blood Pressure not due until??02/10/21??and every 1??year(s)  ??? ? ? ?Hypertension Management-BMP not due until??02/10/21??and every 1??year(s)  ???Satisfied?(in the past 1 year)  ??? ??Satisfied?  ??? ? ? ?ADL Screening on??06/05/19.??Satisfied by Mandi Serna LPN  ??? ? ? ?Alcohol Misuse Screening on??02/11/20.??Satisfied by Nils Dillard  ??? ? ? ?Asthma Management-Asthma Medication Prescribed on??06/05/19.??Satisfied by Mayuri Priest  ??? ? ? ?Blood Pressure Screening on??02/11/20.??Satisfied by Nils Dillard  ??? ? ? ?Body Mass Index Check on??02/11/20.??Satisfied by Nils Dillard  ??? ? ? ?Breast Cancer Screening on??06/18/19.??Satisfied by Cami Carrillo  ??? ? ? ?Cervical Cancer Screening on??06/05/19.??Satisfied by Jessie Lauren.  ??? ? ? ?Depression Screening on??02/11/20.??Satisfied by Nils Dillard  ??? ? ? ?Diabetes Screening on??02/11/20.??Satisfied by Susi Silva  ??? ? ? ?Hypertension Management-BMP on??02/11/20.??Satisfied by Susi Silva  ??? ? ? ?Influenza Vaccine on??10/24/19.??Satisfied by Alli ISRAEL, Kate JAMIL  ??? ? ? ?Lipid Screening  on??06/05/19.??Satisfied by Noris Barnhart  ??? ? ? ?Obesity Screening on??02/11/20.??Satisfied by Nils Dillard  ?  Lab Results  Test Name Test Result Date/Time Comments   Creatinine 0.90 mg/dL 02/11/2020 13:40 CST    Creatinine 0.90 mg/dL 09/12/2019 13:15 CDT    eGFR-AA 83 mL/min (Low) 02/11/2020 13:40 CST    eGFR-AA 83 mL/min (Low) 09/12/2019 13:15 CDT    AST 27 unit/L 02/11/2020 13:40 CST    AST 39 unit/L (High) 09/12/2019 13:15 CDT    ALT 17 unit/L 02/11/2020 13:40 CST    ALT 25 unit/L 09/12/2019 13:15 CDT    Hgb A1c 6.0 % 06/05/2019 09:15 CDT    Chol 157 mg/dL 06/05/2019 09:15 CDT    HDL 44 mg/dL 06/05/2019 09:15 CDT    Trig 89 mg/dL 06/05/2019 09:15 CDT    LDL 95 mg/dL 06/05/2019 09:15 CDT    Chol/HDL 3.6 06/05/2019 09:15 CDT    TSH 0.643 mIU/L 06/05/2019 09:15 CDT    WBC 3.6 x10(3)/mcL (Low) 02/11/2020 13:40 CST    WBC 3.2 x10(3)/mcL (Low) 09/12/2019 13:15 CDT    Hgb 13.1 gm/dL 02/11/2020 13:40 CST    Hgb 13.9 gm/dL 09/12/2019 13:15 CDT    Hct 40.6 % 02/11/2020 13:40 CST    Hct 42.9 % 09/12/2019 13:15 CDT    Platelet 230 x10(3)/mcL 02/11/2020 13:40 CST    Platelet 175 x10(3)/mcL 09/12/2019 13:15 CDT    CD4 Pct 9.6 % (Low) 09/12/2019 13:15 CDT    CD4 Absolute 141 unit/L (Low) 09/12/2019 13:15 CDT    CMV DNA by PCR-LC 1230 IU/mL 09/12/2019 13:15 CDT ?  The quantitative range of this assay is 200 to 1 million  IU/mL.  ?  This test was developed and its performance characteristics  determined by [facility]. It has not been cleared or approved  by the Food and Drug Administration. The FDA has determined  that such clearance or approval is not necessary.   Toxo gondii PCR-LC Negative 09/12/2019 13:15 CDT No Toxoplasma gondii DNA detected.  This test was developed and its performance characteristics  determined by [facility]. It has not been cleared  or approved by the [place]. Food and Drug Administration. The  FDA has determined that such clearance or approval is not  necessary. This test is used for clinical  purposes. It  should not be regarded as investigational or research.  Performed At:  [facility] [id number]  Antoine Quinonez MD Ph:[phone]   HIV1 RNA PCR-LC 06124 cpy/mL 09/12/2019 13:15 CDT ?  The reportable range for this assay is 20 to 10,000,000  copies HIV-1 RNA/mL.      [1]?Office Visit Note; Mayuri Priest 10/24/2019 11:53 CDT

## 2022-05-03 NOTE — HISTORICAL [facility] CERNER
This is a historical note converted from Katharina. Formatting and pictures may have been removed.  Please reference Katharina for original formatting and attached multimedia. Chief Complaint  f/u [employer] mg[employer]  History of Present Illness  58 yo BF presents for HIV f/u visit.? Admits to not taking ART, states that she vomits them up even with Zofran prior to ART.? Pt feels confident that if she had only 1 tab, that she could keep it down.? She has already quit etoh & cigs.?Requesting?STR.?C/o bright red rectal bleeding x 1 wk, up to 1/2 cup total estimated blood loss.?Bleeding has slacked off in past couple of days.?C/o fatigue & abd pain worse after eating.? States bowels moving daily, but is broken up.? Denies diarrhea or cramping.? Describes abd pain and pressure.?? Has not completed FOBT cards, still has at home. Has upcoming appt with GYN for colpo clinic.  ?  10/11/17  58 yo BF presents for HIV f/u visit.? Admits to ongoing non-adherence to ART, states that they make her nauseated & hot flashes.? Tried taking Zofran 30min prior to ART at bedtime, but awakens with morning n/v.? Never tried taking a 2nd dose of zofran in the am.? Also reports that sugarcane farmers brought bags near her home that attracted tree rats.? Tree rats destroyed home, clothes, even eating her meds.? Treated with poison provided by feed store & paid for by farmers. Rats now eradicated per report.? States thrush has returned to left cheek, responds best to nystatin swish & swallow.? States has never liked taking pills. 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 Amenable to flu vax today. Quit smoking x 6 yrs.? Denies shortness of breath, chest pain. [1]  Review of Systems  ?  ?  Constitutional: negative except as stated in HPI  Eye: negative except as stated in HPI  ENMT: negative except as stated in HPI  Respiratory: negative except as stated in HPI  Cardiovascular: negative except as stated in HPI  Gastrointestinal:  negative except as stated in HPI  Genitourinary: negative except as stated in HPI  Hema/Lymph: negative except as stated in HPI  Endocrine: negative except as stated in HPI  Immunologic: negative except as stated in HPI  Musculoskeletal: negative except as stated in HPI  Integumentary: negative except as stated in HPI  Neurologic: negative except as stated in HPI  ?   All Other ROS_ ?negative except as stated in HPI  Physical Exam  Vitals & Measurements  T:?36.4? ?C ?(Oral)? HR:?77?(Peripheral)? RR:?12? BP:?165/94?  HT:?142?cm? HT:?142?cm? WT:?46?kg? WT:?46?kg? BMI:?22.81?  ?  ?  General: AAO X 4, afebrile  Eye: no icterus, pink conjunctivae  HENT: oropharynx clear, thrush noted to tongue  Neck: supple  Respiratory: BBS CTA, non-labored, symmetrical  Cardiovascular: S1S2, RRR  Gastrointestinal BS + 4 quadrants, NTND, soft, no organomegaly  Genitourinary: non-tender  Lymphatics:?ant/post cervical?lymphadenopathy, soft, mobile, NT  Musculoskeletal: MAEW, steady gait  Integumentary: WDI  Neurologic: CN II-XII intact  Assessment/Plan  1.?HIV (human immunodeficiency virus infection)  ? ? extensive adherence counseling done, pt aware of dire consequences on ongoing non-adherence  D/C descovy & tivicay, replace with Odefsey 1 po daily with a meal, resume bactrim ds?daily  zofran 8mg po tid prn n/v  rtc 1 month w jay  Ordered:  emtricitabine/rilpivirine/tenofovir, 1 tab(s), Oral, Daily, # 30 tab(s), 2 Refill(s), Pharmacy: [facility]  CBC w/ Auto Diff, Routine collect, 11/20/17 11:51:00 CST, Blood, Order for future visit, Stop date 11/20/17 11:51:00 CST, Lab Collect, HIV (human immunodeficiency virus infection)  Bright red rectal bleeding, 11/20/17 11:51:00 CST  Clinic Follow up, *Est. 12/20/17 3:00:00 CST, Order for future visit, HIV (human immunodeficiency virus infection), [facility]  Comprehensive Metabolic Panel, Routine collect, 11/20/17 11:51:00 CST, Blood, Order for future visit, Stop date 11/20/17  11:51:00 CST, Lab Collect, HIV (human immunodeficiency virus infection)  Hypertension, 11/20/17 11:51:00 CST  Office/Outpatient Visit Level 4 Established 13990 PC, HIV (human immunodeficiency virus infection)  Hypertension  Bright red rectal bleeding  Candidiasis, [facility], 11/20/17 12:05:00 CST  ?  2.?Hypertension  ? resume home meds  Ordered:  Comprehensive Metabolic Panel, Routine collect, 11/20/17 11:51:00 CST, Blood, Order for future visit, Stop date 11/20/17 11:51:00 CST, Lab Collect, HIV (human immunodeficiency virus infection)  Hypertension, 11/20/17 11:51:00 CST  Office/Outpatient Visit Level 4 Established 93703 PC, HIV (human immunodeficiency virus infection)  Hypertension  Bright red rectal bleeding  Candidiasis, [facility], 11/20/17 12:05:00 CST  ?  3.?Bright red rectal bleeding  ? cbc today  CT abd & pelvis w & w/o contrast within 2?wks  refer to GI clinic  submit FOBT cards as previously ordered  Ordered:  CBC w/ Auto Diff, Routine collect, 11/20/17 11:51:00 CST, Blood, Order for future visit, Stop date 11/20/17 11:51:00 CST, Lab Collect, HIV (human immunodeficiency virus infection)  Bright red rectal bleeding, 11/20/17 11:51:00 CST  CT Abdomen and Pelvis W W/O Contrast, Routine, *Est. 11/20/17 3:00:00 CST, Other (please specify), rectal bleeding, abd pain, None, Ambulatory, Rad Type, Oral Contrast, Order for future visit, Bright red rectal bleeding, Schedule this test, [facility], Sometime Before...  Office/Outpatient Visit Level 4 Established 47343 PC, HIV (human immunodeficiency virus infection)  Hypertension  Bright red rectal bleeding  Candidiasis, [facility], 11/20/17 12:05:00 CST  ?  4.?Candidiasis  ? fluconazole 200mg/day  Ordered:  Office/Outpatient Visit Level 4 Established 22610 PC, HIV (human immunodeficiency virus infection)  Hypertension  Bright red rectal bleeding  Candidiasis, [facility], 11/20/17 12:05:00 CST  ?  Orders:  fluconazole, 200 mg =  1 tab(s), Oral, Daily, # 30 tab(s), 0 Refill(s), Pharmacy: [facility]   Problem List/Past Medical History  Ongoing  Asthma  Depression  HIV (human immunodeficiency virus infection)  Hypertension  Visit for screening mammogram  Well adult exam  Historical  Morbid obesity  Vitamin D deficiency(  Confirmed  )  Procedure/Surgical History  plastic surgery- face  Medications  albuterol 90 mcg/inh inhalation aerosol, 2 puff(s), Oral, q4-6hr, PRN, 6 refills  Flonase 50 mcg/inh nasal spray, 1 spray(s), Nasal, BID  fluconazole 200 mg oral tablet, 200 mg= 1 tab(s), Oral, Daily  fluconazole 200 mg oral tablet, 200 mg= 1 tab(s), Oral, Daily, 1 refills  hydrochlorothiazide-lisinopril 25 mg-20 mg oral tablet, 1 tab(s), Oral, Daily, 3 refills  ibuprofen 600 mg oral tablet, 600 mg= 1 tab(s), Oral, q8hr, PRN  K-Dur 20 oral tablet, extended release, 20 mEq= 1 tab(s), Oral, Every other day, 3 refills  loratadine 10 mg oral tablet, 10 mg= 1 tab(s), Oral, Daily, 3 refills  Neurontin 300 mg oral capsule, 300 mg= 1 cap(s), Oral, TID, 2 refills  Norvasc 10 mg oral tablet, 10 mg= 1 tab(s), Oral, Daily, 8 refills  Odefsey oral tablet, 1 tab(s), Oral, Daily, 2 refills  sulfamethoxazole-trimethoprim 800 mg-160 mg oral tablet, 1 tab(s), Oral, Daily, 2 refills  Zofran 8 mg oral tablet, 8 mg= 1 tab(s), Oral, q12hr, PRN, 1 refills  Allergies  No Known Allergies  No Known Medication Allergies  Social History  Alcohol - Denies Alcohol Use, 01/12/2017  Past  Employment/School - 01/12/2017  Unemployed, Work/School description: Sits with Elderly.  Unemployed, Highest education level: None.  Exercise - 01/12/2017  Exercise frequency: Daily. Self assessment: Good condition. Exercise type: Walking.  Home/Environment - 01/12/2017  Lives with Alone. Living situation: Home/Independent. Alcohol abuse in household: No. Substance abuse in household: No. Smoker in household: No. Injuries/Abuse/Neglect in household: No. Feels unsafe at home:  No.  Nutrition/Health - 01/12/2017  Regular  Substance Abuse - Denies Substance Abuse, 01/12/2017  Tobacco - Denies Tobacco Use, 01/12/2017  Former smoker  Family History  Hypertension.: Mother, Sister and Brother.  Primary malignant neoplasm of breast: Mother.  Primary malignant neoplasm of prostate: Father.  Thyroid disease.: Mother.  Immunizations  UTD  Health Maintenance  cervical pap 1/26/15, colpo 3/20/15, 1/15/16 PAP [facility], colpo 3/16, repeat PAP 4/17 per GYN, 10/2017 [facility] colpo clinic 12/8/17  mammogram 3/13/15, 4/16 cat 2 benign, 8/17 cat 1 benign  anal pap 12/1/15 ASCUS, 10/17 NIL  anal ct/gc 12/1/15 neg, 10/17 neg  oral ct/gc 12/1/15 neg, 10/17 neg  cervical/vag ct/gc 1/15/16 neg, 10/17 neg  ophth 2/18/16  Dexa 2/15 NL  Lab Results  Test Name Test Result Date/Time Comments   Creatinine 1.00 mg/dL 10/11/2017 13:58 CDT    AST 26 unit/L 10/11/2017 13:58 CDT    ALT 18 unit/L 10/11/2017 13:58 CDT    Chol 144 mg/dL 01/12/2017 14:09 CST    HDL 27 mg/dL (Low) 01/12/2017 14:09 CST    Trig 176 mg/dL (High) 01/12/2017 14:09 CST    LDL 82 mg/dL 01/12/2017 14:09 CST    Chol/HDL 5.3 (High) 01/12/2017 14:09 CST    TSH 0.549 mIU/L 07/31/2017 08:50 CDT    WBC 4.7 x10(3)/mcL 10/11/2017 13:58 CDT    Hgb 13.4 gm/dL 10/11/2017 13:58 CDT    Hct 39.9 % 10/11/2017 13:58 CDT    Platelet 147 x10(3)/mcL 10/11/2017 13:58 CDT    CD4 Pct 9.0 % (Low) 10/11/2017 13:58 CDT    CD4 Absolute 178 unit/L (Low) 10/11/2017 13:58 CDT    HIV1 RNA PCR-LC 03588 cpy/mL 10/11/2017 13:58 CDT <br/>The reportable range for this assay is 20 to 10,000,000<br/>copies HIV-1 RNA/mL.      [1]?Office Visit Note; Mayuri Cortes 10/11/2017 13:00 CDT  [2]?Office Visit Note; Mayuri Cortes 10/11/2017 13:00 CDT

## 2022-05-03 NOTE — HISTORICAL [facility] CERNER
This is a historical note converted from Katharina. Formatting and pictures may have been removed.  Please reference Katharina for original formatting and attached multimedia. Chief Complaint  F/U HIV. No complaints  History of Present Illness  58 yo BF presents for HIV f/u visit.? Admits to ongoing non-adherence to ART, states that they make her nauseated & hot flashes.? Tried taking Zofran 30min prior to ART at bedtime, but awakens with morning n/v.? Never tried taking a 2nd dose of zofran in the am.? Also reports that sugarcane farmers brought bags near her home that attracted tree rats.? Tree rats destroyed home, clothes, even eating her meds.? Treated with poison provided by feed store & paid for by farmers. Rats now eradicated per report.? States thrush has returned to left cheek, responds best to nystatin swish & swallow.? States has never liked taking pills. 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 Amenable to flu vax today. Quit smoking x 6 yrs.? Denies shortness of breath, chest pain.  Review of Systems  ?  ?  Constitutional: negative except as stated in HPI  Eye: negative except as stated in HPI  ENMT: negative except as stated in HPI  Respiratory: negative except as stated in HPI  Cardiovascular: negative except as stated in HPI  Gastrointestinal: negative except as stated in HPI  Genitourinary: negative except as stated in HPI  Hema/Lymph: negative except as stated in HPI  Endocrine: negative except as stated in HPI  Immunologic: negative except as stated in HPI  Musculoskeletal: negative except as stated in HPI  Integumentary: negative except as stated in HPI  Neurologic: negative except as stated in HPI  ?   All Other ROS_ ?negative except as stated in HPI  Physical Exam  Vitals & Measurements  T:?37.1? ?C ?(Oral)? HR:?97?(Peripheral)? RR:?20? BP:?140/89?  HT:?142?cm? HT:?142?cm? WT:?47.2?kg? WT:?47.2?kg? BMI:?23.41?  General: AAO X 4, afebrile  Eye: no icterus  HENT:  oropharynx clear, oral ct/gc swab done  Neck: supple  Respiratory: BBS CTA, non-labored, symmetrical  Cardiovascular: S1S2, RRR  Breasts: no skin changes, no palp masses, no LAD, no nipple d/c  Gastrointestinal BS + 4 quadrants, NTND, soft, no organomegaly  Genitourinary: non-tender, no ext lesions,thick white vag?d/c noted adhering to vag wall, cervix pink, no lesions, no palp mass ut/adx  Rectal: no ext lesions, good sphincter tone, no palp mass, no thad blood, swab done for ct/gc & pap  Lymphatics: no lymphadenopathy  Musculoskeletal: MAEW, steady gait  Integumentary: WDI  Neurologic: CN II-XII intact  Assessment/Plan  1.?HIV (human immunodeficiency virus infection)  ? extensive adherence counseling done  resume Descovy 1 po daily, tivicay 50mg daily, bactrim ds?daily  zofran 8mg po tid prn n/v  labs today  rtc 6 wks w jay  Ordered:  sulfamethoxazole-trimethoprim, 1 tab(s), Oral, Daily, # 30 tab(s), 2 Refill(s), Pharmacy: [facility]  CBC w/ Auto Diff, Routine collect, 10/11/17 13:02:00 CDT, Blood, Stop date 10/11/17 13:02:00 CDT, Lab Collect, HIV (human immunodeficiency virus infection), 10/11/17 13:02:00 CDT  CD4 Lymphoctye Count, Routine collect, 10/11/17 13:02:00 CDT, Blood, Stop date 10/11/17 13:02:00 CDT, Lab Collect, HIV (human immunodeficiency virus infection), 10/11/17 13:02:00 CDT  Clinic Follow up, 10/11/17 13:01:00 CDT, HIV (human immunodeficiency virus infection), [facility]  Comprehensive Metabolic Panel, Routine collect, 10/11/17 13:02:00 CDT, Blood, Stop date 10/11/17 13:02:00 CDT, Lab Collect, HIV (human immunodeficiency virus infection), 10/11/17 13:02:00 CDT  Office/Outpatient Visit Level 5 Established 99625 PC, 25, HIV (human immunodeficiency virus infection)  Routine screening for STI (sexually transmitted infection)  Cervical cancer screening  Screening for rectal cancer  Depression, [facility], 10/11/17 12:38:00 CDT  RNA, PCR(NonGraph)rfx/GenoPRIme(R)-[facility] [id number],  Routine collect, 10/11/17 13:02:00 CDT, Blood, Stop date 10/11/17 13:02:00 CDT, Lab Collect, HIV (human immunodeficiency virus infection), 10/11/17 13:02:00 CDT  ?  2.?Routine screening for STI (sexually transmitted infection)  ?vag, oral & anal ct/gc swabs, wet prep collected  Ordered:  Chlamydia trach & N.gonorr Rectal AMD Lab Arya, Routine collect, Rectal Swab, Order for future visit, Collected, 10/11/17 12:41:00 CDT, Stop date 10/11/17 12:41:00 CDT, Nurse collect, Routine screening for STI (sexually transmitted infection), 10/11/17 12:41:00 CDT  Chlamydia trach & N.gonorrhoeae Pharyn AMD-LC, Routine collect, Throat, Order for future visit, Collected, 10/11/17 12:41:00 CDT, Stop date 10/11/17 12:41:00 CDT, Nurse collect, Routine screening for STI (sexually transmitted infection), 10/11/17 12:41:00 CDT  Chlamydia trach and N. gonorrhea PCR, Routine collect, Cervical, Order for future visit, Collected, 10/11/17 12:37:00 CDT, Stop date 10/11/17 12:37:00 CDT, Nurse collect, Routine screening for STI (sexually transmitted infection)  Office/Outpatient Visit Level 5 Established 46661 , 25, HIV (human immunodeficiency virus infection)  Routine screening for STI (sexually transmitted infection)  Cervical cancer screening  Screening for rectal cancer  Depression, [facility], 10/11/17 12:38:00 CDT  Wet Prep Smear, Routine collect, Vaginal, Order for future visit, 10/11/17 12:37:00 CDT, Stop date 10/11/17 12:37:00 CDT, Nurse collect, Routine screening for STI (sexually transmitted infection), 10/11/17 12:37:00 CDT  ?  3.?Cervical cancer screening  ? cervical?pap done  Ordered:  Office/Outpatient Visit Level 5 Established 64725 PC, 25, HIV (human immunodeficiency virus infection)  Routine screening for STI (sexually transmitted infection)  Cervical cancer screening  Screening for rectal cancer  Depression, [facility], 10/11/17 12:38:00 CDT  Pap Collections PC, 10/11/17 12:38:00 CDT, [facility], Routine, 10/11/17  12:38:00 CDT  Pathology Gyn Request, 10/11/17 13:02:00 CDT, AP Specimen, Thin Prep Pap Cervical-Auto/man screen, Dysplasia, Cervical, Thin Prep Pap, Post-Menopausal, 01/01/14, Previous Pap, 1/2016, Abnormal Pap, None, Previous Pregnancy, Cervix Present, Routine, Collected, RT - Routine,...  ?  4.?Screening for rectal cancer  ? anal?pap done  Ordered:  Office/Outpatient Visit Level 5 Established 90214 , 25, HIV (human immunodeficiency virus infection)  Routine screening for STI (sexually transmitted infection)  Cervical cancer screening  Screening for rectal cancer  Depression, [facility], 10/11/17 12:38:00 CDT  Pathology Non-Gyn Request [facility], 10/11/17 12:41:00 CDT, Routine, Order for future visit, AP Specimen, anal swab, anal ca screen/hiv, Nurse Collect, Print Label, RT - Routine, [email or web address], Hold Until Collected, Screening for rectal cancer, 10/11/17 12:41:00 CDT  ?  5.?Depression  ? denies, will cont to monitor  Ordered:  Office/Outpatient Visit Level 5 Established 18670 , 25, HIV (human immunodeficiency virus infection)  Routine screening for STI (sexually transmitted infection)  Cervical cancer screening  Screening for rectal cancer  Depression, [facility], 10/11/17 12:38:00 CDT  ?  6.?Need for vaccination  ? flu vax today  Ordered:  Influenza Virus Vaccine, Inactivated, 0.5 mL, form: Susp, IM, Once, first dose 10/11/17 12:37:00 CDT, stop date 10/11/17 12:37:00 CDT  ?  7.?Cough,?Cough  Ordered:  loratadine, 10 mg = 1 tab(s), Oral, Daily, # 30 tab(s), 3 Refill(s), Pharmacy: [facility]  Cryptococcus Antigen, Serum-[facility] [id number], Routine collect, 10/11/17 13:02:00 CDT, Blood, Stop date 10/11/17 13:02:00 CDT, Lab Collect, Cough, 10/11/17 13:02:00 CDT  Histoplasma Galmannan Ag Urine-[facility] [id number], Routine collect, Urine, 10/11/17 13:02:00 CDT, Stop date 10/11/17 13:02:00 CDT, Nurse collect, Cough, 10/11/17 13:02:00 CDT  Miscellaneous Lab Test, Routine collect, Urine, 10/11/17 13:02:00 CDT,  blastomyces, Lab Collect, Stop date 10/11/17 13:02:00 CDT, Cough  Miscellaneous Lab Test, Routine collect, Blood, 10/11/17 13:02:00 CDT, fungitell, Lab Collect, Stop date 10/11/17 13:02:00 CDT, Cough  ?  8.?Hypertension  Ordered:  hydrochlorothiazide-lisinopril, 1 tab(s), Oral, Daily, # 30 tab(s), 3 Refill(s), Pharmacy: [facility]  ?  Orders:  fluconazole, 200 mg = 1 tab(s), Oral, Daily, # 30 tab(s), 1 Refill(s), Pharmacy: [facility]  nystatin, 500,000 units = 5 mL, Oral, QID, swish and swallow, X 7 day(s), # 140 mL, 0 Refill(s), Pharmacy: [facility]  ondansetron, 8 mg = 1 tab(s), Oral, q12hr, PRN PRN as needed for nausea/vomiting, # 60 tab(s), 1 Refill(s), Pharmacy: [facility]   Problem List/Past Medical History  Ongoing  Asthma  Depression  HIV (human immunodeficiency virus infection)  Hypertension  Visit for screening mammogram  Well adult exam  Historical  Morbid obesity  Vitamin D deficiency(  Confirmed  )  Procedure/Surgical History  plastic surgery- face  Medications  albuterol 90 mcg/inh inhalation aerosol, 2 puff(s), Oral, q4-6hr, PRN, 6 refills  Cheratussin AC 10 mg-100 mg/5 mL oral syrup, 5 mL, Oral, q4hr, PRN  Descovy 200 mg-25 mg oral tablet, 1 tab(s), Oral, Daily  ergocalciferol 50,000 intl units (1.25 mg) oral capsule, 84716 IntUnit= 1 cap(s), Oral, QOWK, 5 refills,? ?Not taking  Flonase 50 mcg/inh nasal spray, 1 spray(s), Nasal, BID  fluconazole 200 mg oral tablet, 200 mg= 1 tab(s), Oral, Daily, 1 refills  hydrochlorothiazide-lisinopril 25 mg-20 mg oral tablet, See Instructions  ibuprofen 600 mg oral tablet, 600 mg= 1 tab(s), Oral, q8hr, PRN  influenza virus vaccine, inactivated preserve-free pediatric quadrivalent intramuscular suspension, 0.5 mL, IM, Once  K-Dur 20 oral tablet, extended release, 20 mEq= 1 tab(s), Oral, Every other day, 3 refills  loratadine 10 mg oral tablet, 10 mg= 1 tab(s), Oral, Daily  Neurontin 300 mg oral capsule, 300 mg= 1 cap(s), Oral, TID,  2 refills  Norvasc 10 mg oral tablet, 10 mg= 1 tab(s), Oral, Daily, 8 refills  nystatin 100,000 units/mL oral suspension, 236987 units= 5 mL, Oral, QID  sulfamethoxazole-trimethoprim 800 mg-160 mg oral tablet, 1 tab(s), Oral, Daily, 2 refills  Tivicay 50 mg oral tablet, 50 mg= 1 tab(s), Oral, Daily  Zofran 8 mg oral tablet, 8 mg= 1 tab(s), Oral, q12hr, PRN, 1 refills  Allergies  No Known Allergies  No Known Medication Allergies  Social History  Alcohol - Denies Alcohol Use, 01/12/2017  Past  Employment/School - 01/12/2017  Unemployed, Work/School description: Sits with Elderly.  Unemployed, Highest education level: None.  Exercise - 01/12/2017  Exercise frequency: Daily. Self assessment: Good condition. Exercise type: Walking.  Home/Environment - 01/12/2017  Lives with Alone. Living situation: Home/Independent. Alcohol abuse in household: No. Substance abuse in household: No. Smoker in household: No. Injuries/Abuse/Neglect in household: No. Feels unsafe at home: No.  Nutrition/Health - 01/12/2017  Regular  Substance Abuse - Denies Substance Abuse, 01/12/2017  Tobacco - Denies Tobacco Use, 01/12/2017  Former smoker  Family History  Hypertension.: Mother, Sister and Brother.  Primary malignant neoplasm of breast: Mother.  Primary malignant neoplasm of prostate: Father.  Thyroid disease.: Mother.  Immunizations  UTD [1]  Health Maintenance  cervical pap 1/26/15, colpo 3/20/15, 1/15/16 PAP LGS[facility], colpo 3/16, repeat PAP 4/17 per GYN, 10/2017  mammogram 3/13/15, 4/16 cat 2 benign, 8/17 cat 1 benign  anal pap 12/1/15 ASCUS, 10/17  anal ct/gc 12/1/15 neg, 10/17  oral ct/gc 12/1/15 neg, 10/17  cervical/vag ct/gc 1/15/16 neg, 10/17  ophth 2/18/16  Dexa 2/15 NL  Lab Results  Test Name Test Result Date/Time Comments   Creatinine 1.00 mg/dL 07/31/2017 08:50 CDT    AST 20 unit/L 01/12/2017 14:09 CST    ALT 12 unit/L 01/12/2017 14:09 CST    Chol 144 mg/dL 01/12/2017 14:09 CST    HDL 27 mg/dL (Low) 01/12/2017 14:09 CST    Trig 176  mg/dL (High) 01/12/2017 14:09 CST    LDL 82 mg/dL 01/12/2017 14:09 CST    Chol/HDL 5.3 (High) 01/12/2017 14:09 CST    TSH 0.549 mIU/L 07/31/2017 08:50 CDT    Microalb/Creat 5.6 mcg/mg Cr 07/31/2017 08:50 CDT    WBC 4.5 x10(3)/mcL 07/31/2017 08:50 CDT    Hgb 14.0 gm/dL 07/31/2017 08:50 CDT    Hct 41.9 % 07/31/2017 08:50 CDT    Platelet 143 x10(3)/mcL 07/31/2017 08:50 CDT    CD4 Pct 7.9 % (Low) 01/12/2017 14:09 CST    CD4 Absolute 163 unit/L (Low) 01/12/2017 14:09 CST    HIV-1 RNA by PCR-LC [id number] cpy/mL 01/12/2017 14:09 CST <br/>The reportable range for this assay is 20 to 10,000,000<br/>copies HIV-1 RNA/mL.   log10 HIV-1 RNA-LC 4.501 LogCopies/mL 01/12/2017 14:09 CST Performed At:  [facility]<br/>[address], [place] [id number]<br/>Isabell HURTADO MD Ph:[phone]      [1]?Office Visit Note; Mayuri Cortes 05/05/2017 14:28 CDT

## 2022-05-03 NOTE — HISTORICAL [facility] CERNER
This is a historical note converted from [employer]. Formatting and pictures may have been removed.  Please reference [employer]ner for original formatting and attached multimedia. Indication for Surgery  Cervical dysplasia  Preoperative Diagnosis  Cervical dysplasia  Postoperative Diagnosis  Cervical dysplasia  Operation  Cold knife conization  Endocervical curettage  Vaginoscopy, peritoneoscopy  ?  Surgeon(s)  Saba Hernandez MD (PGY-2)  ?  Attending  Angela Genao MD  Anesthesia  General, LMA  Estimated Blood Loss  5 mL  Urine Output  15 mL  Findings  EUA: 0.5 cm palpable cervix, uterus 4-5 cm, anteverted, mobile, no adnexal fullness; cervix bulbous in appearance, ~4 cm width  Intraop, peritoneal survey with hysteroscope; no bleeding noted, no succus fluid, no serosal injury noted on thorough survey of posterior cul de sac, rectum viewed entirely up to approximately 4 cm from the vaginal cuff and without damage; posterior uterus seen and no injury or bleeding, only bleeding that was visualized noted to be coming from posterior vaginal cuff (1 cm defect). Bilateral adnexa seen and no masses noted.  Specimen(s)  Cervix, cone biopsy  Endocervical curettings  Complications  Entry of peritoneum  Technique  The patient was then brought to the operating room with IVF running and SCDs in place. General anesthesia was obtained and found to be adequate. The patient was then prepped and draped in the normal sterile fashion in the dorsal supine position with Valente type stirrups.?  ?   The cervix was visualized. The anterior lip of the cervix was then grasped with a single-tooth tenaculum.?2.5 mL vasopressin injected at 2, 4, 8, and 10 oclock position. Two stay sutures were placed using 0 Vicryl at the 3 and 9 oclock positions. An 11 blade, curved scalpel was used to?obtain the cone specimen starting at the?12 oclock position and moving in a counter-clockwise manner.?During transection of the based of the entire cone specimen, a loss  of resistance was noted and it was suspected that the posterior cervix was entered to peritoneum. ?At this point, the specimen was truncated extraperitoneally (of what was visible) and passed off for permanent review. ?Digital examination revealed a 5-7mm palpable defect in the posterior cervix which was then blunted explored to 1.5cm to feel for any adhesions. ?None were palpated. ?Decision was made to then proceed with vaginoscopy and peritoneoscopy with the 5mm hysteroscope and normal saline as distention medium. ?A thorough survey of the posterior cul de sac was performed and no injury noted.??  Maximum deficit of the normal saline was 50cc. ?The hysteroscope removed and an ECC performed of the very small internal os still present anterior to the defect. ?Of note, her uterus was approximately 5cm in entirety confirmed on hysteroscopy.??The posterior vaginal peritoneum was then closed with 2 interrupted figure of eight stitches using 0-Vicryl to the cervicovaginal stroma. Excellent closure noted, and care was taken to visualize the peritoneum as it was closed. ?There was no palpable defect at this point.  The anterior lip of the cervix was coagulated with the rollerball monopolar device and care taken to avoid the posterior or deep cervix. ?Surgicel was then placed and both stay sutures tied in the midline.  ?  The patient tolerated the procedure well. The patient was then extubated and awakened from anesthesia and brought to the recovery room in stable condition.  ?   was present for the entire procedure  ?  ?  ?   I was personally present and performed a portion of this surgery.? I agree with note above.

## 2022-05-03 NOTE — HISTORICAL [facility] CERNER
This is a historical note converted from [employer]. Formatting and pictures may have been removed.  Please reference [employer] for original formatting and attached multimedia. Chief Complaint  F/U B20 mgmt  History of Present Illness  Rand is a 59 yo BF presenting today for HIV f/u visit.? Has not taken any meds for many months.? States that home is rat, raccoon, and insect infested home.? Water has been turned off, does not have the funds to restore.? States that it rains in the home & there are holes in the walls by rats.? Planning to move out of home within next few weeks.? Thorne noted on pts clothing.? Pruritic rash noted to arms & torso, small bites.? Cough noted, non-productive.? Weight loss.? Poor appetite.? Discussed alternative living options such as assisted living/nursing home on temporary basis, states that she has no one to watch her 2 small dogs that are her family.? Daughter lives in [place], not aware of pts condition.?[facility]  Mary Carmen.? ?Amenable to flu vax today.  ?  6/19/18  Ms. Conway is a 58 yo BF presenting today for HIV f/u visit.? Admits to being off HIV meds for many months now.? Cites n/v as reason for stopping meds.? Appetite poor, loosing weight.? States praying daily, doesnt want to give up.? Has bags of meds at home, but taking only some of them intermittently.? States taking depression meds prn and has plenty at home. BP at goal.? Thrush in mouth painful, vaginal thick white discharge & itching.??Expresses desire to resume meds & will do so if appetite & n/v can be controlled.  ?   58 yo BF presents for HIV f/u visit.? Admits to not taking ART, states that she vomits them up even with Zofran prior to ART.? Pt feels confident that if she had only 1 tab, that she could keep it down.? She has already quit etoh & cigs.?Requesting?STR.?C/o bright red rectal bleeding x 1 wk, up to 1/2 cup total estimated blood loss.?Bleeding has slacked off in past couple of days.?C/o  fatigue & abd pain worse after eating.? States bowels moving daily, but is broken up.? Denies diarrhea or cramping.? Describes abd pain and pressure.?? Has not completed FOBT cards, still has at home. Has upcoming appt with GYN for colpo clinic.  ?  Review of Systems  ?  ?  Constitutional: negative except as stated in HPI  Eye: negative except as stated in HPI  ENMT: negative except as stated in HPI  Respiratory: negative except as stated in HPI  Cardiovascular: negative except as stated in HPI  Gastrointestinal: negative except as stated in HPI  Genitourinary: negative except as stated in HPI  Hema/Lymph: negative except as stated in HPI  Endocrine: negative except as stated in HPI  Immunologic: negative except as stated in HPI  Musculoskeletal: negative except as stated in HPI  Integumentary: negative except as stated in HPI  Neurologic: negative except as stated in HPI  ?   All Other ROS_ ?negative except as stated in HPI  Physical Exam  Vitals & Measurements  T:?36.7? ?C (Oral)? HR:?69(Peripheral)? RR:?16? BP:?159/101?  HT:?142?cm? HT:?142?cm? WT:?41?kg? WT:?41?kg? BMI:?20.33?  ?  ?  General: AAO X 4, afebrile  Eye: no icterus  HENT: oropharynx clear  Neck: supple  Respiratory: BBS CTA, non-labored, symmetrical  Cardiovascular: S1S2, RRR  Gastrointestinal BS + 4 quadrants, NTND, soft, no organomegaly  Genitourinary: non-tender  Lymphatics: no lymphadenopathy  Musculoskeletal: MAEW, steady gait  Integumentary: WDI  Neurologic: CN II-XII intact  Assessment/Plan  1.?HIV (human immunodeficiency virus infection)  extensive counseling done, refer to case management to assist with housing arrangements?  resume Biktarvy 1 po daily,?Bactrim DS 1 po daily  labs today  rtc 1 month w Mayuri  Ordered:  Blastomyces Ab Qn ID-[facility] [id number], Routine collect, 10/19/18 14:23:00 CDT, Blood, Order for future visit, Stop date 10/19/18 14:23:00 CDT, Lab Collect, HIV (human immunodeficiency virus infection)  Cough, 10/19/18 14:23:00  CDT  CBC w/ Auto Diff, Routine collect, 10/19/18 14:24:00 CDT, Blood, Order for future visit, Stop date 10/19/18 14:24:00 CDT, Lab Collect, HIV (human immunodeficiency virus infection)  Cough, 10/19/18 14:24:00 CDT  CD4 Lymphoctye Count, Routine collect, 10/19/18 14:24:00 CDT, Blood, Order for future visit, Stop date 10/19/18 14:24:00 CDT, Lab Collect, HIV (human immunodeficiency virus infection)  Cough, 10/19/18 14:24:00 CDT  Clinic Follow up, *Est. 11/19/18 3:00:00 CST, Order for future visit, HIV (human immunodeficiency virus infection), [facility]  Comprehensive Metabolic Panel, Routine collect, 10/19/18 14:24:00 CDT, Blood, Order for future visit, Stop date 10/19/18 14:24:00 CDT, Lab Collect, HIV (human immunodeficiency virus infection)  Cough, 10/19/18 14:24:00 CDT  Cryptococcus Antigen, Serum-[facility] [id number], Routine collect, 10/19/18 14:24:00 CDT, Blood, Order for future visit, Stop date 10/19/18 14:24:00 CDT, Lab Collect, HIV (human immunodeficiency virus infection)  Cough, 10/19/18 14:24:00 CDT  Cytomegalovirus DNA Quantitation by PCR-[facility] [id number], Routine collect, 10/19/18 14:24:00 CDT, Blood, Order for future visit, Stop date 10/19/18 14:24:00 CDT, Lab Collect, HIV (human immunodeficiency virus infection)  Cough, 10/19/18 14:24:00 CDT  Fungitell,Serum-[facility] [id number], Routine collect, 10/19/18 14:23:00 CDT, Blood, Order for future visit, Stop date 10/19/18 14:23:00 CDT, Lab Collect, HIV (human immunodeficiency virus infection)  Cough, 10/19/18 14:23:00 CDT  Histoplasma Galmannan Ag Urine-[facility] [id number], Routine collect, Urine, Order for future visit, 10/19/18 14:23:00 CDT, Stop date 10/19/18 14:23:00 CDT, Nurse collect, HIV (human immunodeficiency virus infection)  Cough, 10/19/18 14:23:00 CDT  Office/Outpatient Visit Level 5 Established 96086 PC, HIV (human immunodeficiency virus infection)  Hypertension  Scabies  Cough  Need for vaccination  Candidiasis, [facility] C, 10/19/18 14:25:00  CDT  RNA, PCR(NonGraph)rfx/GenoPRIme(R)-[facility] [id number], Routine collect, 10/19/18 14:24:00 CDT, Blood, Order for future visit, Stop date 10/19/18 14:24:00 CDT, Lab Collect, HIV (human immunodeficiency virus infection)  Cough, 10/19/18 14:24:00 CDT  Toxoplasma gondii by PCR-[facility] [id number], Routine collect, 10/19/18 14:24:00 CDT, Blood, Order for future visit, Stop date 10/19/18 14:24:00 CDT, Lab Collect, HIV (human immunodeficiency virus infection)  Cough, 10/19/18 14:24:00 CDT  XR Chest 2 Views, Routine, 10/19/18 14:23:00 CDT, Cough, None, Ambulatory, Rad Type, Order for future visit, Cough  HIV (human immunodeficiency virus infection), Not Scheduled, 10/19/18 14:23:00 CDT  ?  2.?Hypertension  ? above goal, not taking meds  resume amlodipine 10mg/day  Ordered:  Office/Outpatient Visit Level 5 Established 90319 , HIV (human immunodeficiency virus infection)  Hypertension  Scabies  Cough  Need for vaccination  Candidiasis, [facility], 10/19/18 14:25:00 CDT  ?  3.?Scabies  ? Elimite cream as?rx, will need alternative housing?  Ordered:  Office/Outpatient Visit Level 5 Established 95947 , HIV (human immunodeficiency virus infection)  Hypertension  Scabies  Cough  Need for vaccination  Candidiasis, [facility], 10/19/18 14:25:00 CDT  ?  4.?Cough  ? labs & cxr as ordered  Ordered:  Blastomyces Ab Qn ID-[facility] [id number], Routine collect, 10/19/18 14:23:00 CDT, Blood, Order for future visit, Stop date 10/19/18 14:23:00 CDT, Lab Collect, HIV (human immunodeficiency virus infection)  Cough, 10/19/18 14:23:00 CDT  CBC w/ Auto Diff, Routine collect, 10/19/18 14:24:00 CDT, Blood, Order for future visit, Stop date 10/19/18 14:24:00 CDT, Lab Collect, HIV (human immunodeficiency virus infection)  Cough, 10/19/18 14:24:00 CDT  CD4 Lymphoctye Count, Routine collect, 10/19/18 14:24:00 CDT, Blood, Order for future visit, Stop date 10/19/18 14:24:00 CDT, Lab Collect, HIV (human immunodeficiency virus infection)   Cough, 10/19/18 14:24:00 CDT  Comprehensive Metabolic Panel, Routine collect, 10/19/18 14:24:00 CDT, Blood, Order for future visit, Stop date 10/19/18 14:24:00 CDT, Lab Collect, HIV (human immunodeficiency virus infection)  Cough, 10/19/18 14:24:00 CDT  Cryptococcus Antigen, Serum-[facility] [id number], Routine collect, 10/19/18 14:24:00 CDT, Blood, Order for future visit, Stop date 10/19/18 14:24:00 CDT, Lab Collect, HIV (human immunodeficiency virus infection)  Cough, 10/19/18 14:24:00 CDT  Cytomegalovirus DNA Quantitation by PCR-[facility] [id number], Routine collect, 10/19/18 14:24:00 CDT, Blood, Order for future visit, Stop date 10/19/18 14:24:00 CDT, Lab Collect, HIV (human immunodeficiency virus infection)  Cough, 10/19/18 14:24:00 CDT  Fungitell,Serum-[facility] [id number], Routine collect, 10/19/18 14:23:00 CDT, Blood, Order for future visit, Stop date 10/19/18 14:23:00 CDT, Lab Collect, HIV (human immunodeficiency virus infection)  Cough, 10/19/18 14:23:00 CDT  Histoplasma Galmannan Ag Urine-[facility] [id number], Routine collect, Urine, Order for future visit, 10/19/18 14:23:00 CDT, Stop date 10/19/18 14:23:00 CDT, Nurse collect, HIV (human immunodeficiency virus infection)  Cough, 10/19/18 14:23:00 CDT  Office/Outpatient Visit Level 5 Established 85749 PC, HIV (human immunodeficiency virus infection)  Hypertension  Scabies  Cough  Need for vaccination  Candidiasis, [facility], 10/19/18 14:25:00 CDT  RNA, PCR(NonGraph)rfx/GenoPRIme(R)-[facility] [id number], Routine collect, 10/19/18 14:24:00 CDT, Blood, Order for future visit, Stop date 10/19/18 14:24:00 CDT, Lab Collect, HIV (human immunodeficiency virus infection)  Cough, 10/19/18 14:24:00 CDT  Toxoplasma gondii by PCR-[facility] [id number], Routine collect, 10/19/18 14:24:00 CDT, Blood, Order for future visit, Stop date 10/19/18 14:24:00 CDT, Lab Collect, HIV (human immunodeficiency virus infection)  Cough, 10/19/18 14:24:00 CDT  XR Chest 2 Views, Routine, 10/19/18 14:23:00 CDT,  Cough, None, Ambulatory, Rad Type, Order for future visit, Cough  HIV (human immunodeficiency virus infection), Not Scheduled, 10/19/18 14:23:00 CDT  ?  5.?Need for vaccination  ? flu vax today  Ordered:  Influenza Virus Vaccine, Inactivated, 0.5 mL, form: Susp, IM, Once, first dose 10/19/18 14:24:00 CDT, stop date 10/19/18 14:24:00 CDT  Office/Outpatient Visit Level 5 Established 83615 , HIV (human immunodeficiency virus infection)  Hypertension  Scabies  Cough  Need for vaccination  Candidiasis, [facility], 10/19/18 14:25:00 CDT  ?  6.?Candidiasis  ? resume fluconazole 200mg/day  Ordered:  Office/Outpatient Visit Level 5 Established 46392 , HIV (human immunodeficiency virus infection)  Hypertension  Scabies  Cough  Need for vaccination  Candidiasis, [facility], 10/19/18 14:25:00 CDT  ?   Problem List/Past Medical History  Ongoing  Asthma  Depression  HIV (human immunodeficiency virus infection)  Hypertension  Visit for screening mammogram  Well adult exam  Historical  Morbid obesity  Vitamin D deficiency(  Confirmed  )  Procedure/Surgical History  plastic surgery- face   Medications  albuterol 90 mcg/inh inhalation aerosol, 2 puff(s), Oral, q4-6hr, PRN, 6 refills  Bactrim  mg-160 mg oral tablet, 1 tab(s), Oral, Daily, 1 refills  Biktarvy oral tablet, 1 tab(s), Oral, Daily  Ensure (butter pecan), 1 can, Oral, TID, 3 refills  fluconazole 200 mg oral tablet, 200 mg= 1 tab(s), Oral, Daily,? ?Not Taking, Completed Rx  hydrochlorothiazide-lisinopril 25 mg-20 mg oral tablet, 1 tab(s), Oral, Daily  influenza virus vaccine, inactivated preserve-free pediatric quadrivalent intramuscular suspension, 0.5 mL, IM, Once  K-Dur 20 oral tablet, extended release, 20 mEq= 1 tab(s), Oral, Every other day, 3 refills,? ?Not Taking, Completed Rx  LORATADINE TAB 10MG, 10 mg= 1 tab(s), Oral, Daily  Megace 40 mg/mL oral suspension, 800 mg= 20 mL, Oral, Daily, 3 refills  METRONIDAZOL TAB 500MG, 500 mg= 1 tab(s),  Oral, BID,? ?Not Taking, Completed Rx  Norvasc 10 mg oral tablet, 10 mg= 1 tab(s), Oral, Daily, 8 refills  NYSTATIN MARIBEL [id number],? 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 Self assessment: Good condition. Exercise type: Walking., 01/12/2017  Home/Environment  Lives with Alone. Living situation: Home/Independent. Alcohol abuse in household: No. Substance abuse in household: No. Smoker in household: No. Injuries/Abuse/Neglect in household: No. Feels unsafe at home: No., 01/12/2017  Nutrition/Health  Regular, 04/28/2016  Substance Abuse - Denies Substance Abuse, 08/15/2014  Tobacco - Denies Tobacco Use, 08/15/2014  Former smoker Use:., 08/16/2018  Family History  Hypertension.: Mother, Sister and Brother.  Primary malignant neoplasm of breast: Mother.  Primary malignant neoplasm of prostate: Father.  Thyroid disease.: Mother.  Immunizations  Vaccine Date Status   influenza virus vaccine, inactivated 10/11/2017 Given   influenza virus vaccine, inactivated 11/09/2016 Given   influenza virus vaccine, inactivated 12/01/2015 Given   pneumococcal 23-polyvalent vaccine 12/01/2015 Given   tetanus/diphth/pertuss (Tdap) adult/adol 08/13/2014 Recorded   pneumococcal 13-valent conjugate vaccine 08/13/2014 Recorded   influenza virus vaccine, inactivated 11/22/2013 Recorded   pneumococcal 23-polyvalent vaccine 12/10/2010 Recorded   hepatitis A adult vaccine 10/05/2010 Recorded   Health Maintenance  Health Maintenance  ???Pending?(in the next  year)  ??? ??OverDue  ??? ? ? ?Diabetes Screening due??and every?  ??? ? ? ?Smoking Cessation due??12/19/16??and every 180??day(s)  ??? ? ? ?Alcohol Misuse Screening due??07/12/18??and every 1??year(s)  ??? ? ? ?Asthma Management-Spirometry due??08/01/18??and every 1??year(s)  ??? ??Due?  ??? ? ? ?ADL Screening due??10/19/18??and every 1??year(s)  ??? ? ? ?Asthma Management-Asthma Education due??10/19/18??and every 6??month(s)  ??? ? ? ?Asthma Management-Wolff Peak Flow due??10/19/18??Variable frequency  ??? ? ? ?Asthma Management-Written Action Plan due??10/19/18??and every 6??month(s)  ??? ? ? ?Influenza Vaccine due??10/19/18??and every?  ??? ??Refused?  ??? ? ? ?Aspirin Therapy for CVD Prevention due??10/19/18??and every 1??year(s)  ??? ??Due In Future?  ??? ? ? ?Hypertension Management-BMP not due until??06/19/19??and every 1??year(s)  ??? ? ? ?Breast Cancer Screening not due until??08/01/19??and every 2??year(s)  ???Satisfied?(in the past 1 year)  ??? ??Satisfied?  ??? ? ? ?Asthma Management-Asthma Medication Prescribed on??02/20/18.??Satisfied by Gianna Sagastume NP  ??? ? ? ?Blood Pressure Screening on??10/19/18.??Satisfied by Prashant Martin  ??? ? ? ?Body Mass Index Check on??10/19/18.??Satisfied by Prashant Martin  ??? ? ? ?Colorectal Screening on??10/19/18.??Satisfied by Halley Tavarez  ??? ? ? ?Depression Screening on??10/19/18.??Satisfied by Prashant Martin  ??? ? ? ?Diabetes Screening on??10/19/18.??Satisfied by Mayuri Cortes.  ??? ? ? ?Hypertension Management-Blood Pressure on??10/19/18.??Satisfied by Prashant Martin  ??? ? ? ?Influenza Vaccine on??10/19/18.??Satisfied by Mayuri Cortes.  ??? ? ? ?Lipid Screening on??06/19/18.??Satisfied by Susi Silva  ??? ? ? ?Obesity Screening on??10/19/18.??Satisfied by Prashant Martin  ?  ?  cervical pap 1/26/15, colpo 3/20/15, 1/15/16 PAP [facility], colpo 3/16, repeat PAP 4/17 per GYN, 10/2017 [facility] colpo clinic  12/8/17  mammogram 3/13/15, 4/16 cat 2 benign, 8/17 cat 1 benign  anal pap 12/1/15 ASCUS, 10/17 NIL  anal ct/gc 12/1/15 neg, 10/17 neg  oral ct/gc 12/1/15 neg, 10/17 neg  cervical/vag ct/gc 1/15/16 neg, 10/17 neg  ophth 2/18/16  Dexa 2/15 NL [1]  Lab Results  Test Name Test Result Date/Time Comments   Creatinine 1.10 mg/dL 06/19/2018 15:55 CDT    AST 26 unit/L 06/19/2018 15:55 CDT    ALT 24 unit/L 06/19/2018 15:55 CDT    Hgb A1c 6.1 % 06/19/2018 15:55 CDT    Chol 153 mg/dL 06/19/2018 15:55 CDT    HDL 35 mg/dL (Low) 06/19/2018 15:55 CDT    Trig 171 mg/dL (High) 06/19/2018 15:55 CDT    LDL 84 mg/dL 06/19/2018 15:55 CDT    Chol/HDL 4.4 06/19/2018 15:55 CDT    WBC 3.1 x10(3)/mcL (Low) 06/19/2018 15:55 CDT    Hgb 14.4 gm/dL 06/19/2018 15:55 CDT    Hct 44.8 % 06/19/2018 15:55 CDT    Platelet 176 x10(3)/mcL 06/19/2018 15:55 CDT    CD4 Pct 10.1 % (Low) 06/19/2018 15:55 CDT    CD4 Absolute 153 unit/L (Low) 06/19/2018 15:55 CDT    HIV1 RNA PCR-LC [id number] cpy/mL 06/19/2018 15:55 CDT <br/>The reportable range for this assay is 20 to 10,000,000<br/>copies HIV-1 RNA/mL.      [1]?Office Visit Note; Mayuri Cortes 06/19/2018 14:58 CDT

## 2022-05-03 NOTE — HISTORICAL [facility] CERNER
This is a historical note converted from [facility]. Formatting and pictures may have been removed.  Please reference Cermark for original formatting and attached multimedia. Interval Update:  ?   Patient presents today for scheduled CKC. She has no complaints today and there are no changes to the H&P.?She is able to describe the procedure in her own words.  ?   Vitals:  Temp 98.2  HR 63  BP 18  O2 sat 100% RA  RR 18  ?   Physical Exam:  General Appearance: Alert, cooperative, no distress  Lungs: Respirations unlabored  Heart: Regular rate  Abdomen: Soft, non-distended, non-tender  Extremities: Extremities normal, atraumatic, no cyanosis or edema  Skin: Skin turgor normal, no rashes or lesions  ?  ?  Plan:  Consents reviewed. To OR for CKC for cervical dysplasia.  ?  Chief Complaint  ?  Preop  History of Present Illness  59yo  with cervical?dysplasia presenting for preop appt for CKC. PMH significant for HIV (AIDS by CD4 count), HTN and depression/anxiety. Closely followed by ID with last visit , has follow up in October. Denying regular?compliance with Biktarvy bc she endorses vomiting th majority of the time?she takes it. Has tried Zofran in the past for this but states that it was not helpful. She does take Bactrim daily for ppx.  ?  Of note, last CD4 count <150. Also with significant psychiatric history of anxiety/depression and social stressors. At her last ID visit, pt complained of thrush sxs, chronic cough, memory loss and frequent headaches and floaters. She has a  at [facility] and has been strongly encouraged to pursue mental health services at [facility]. Has not yet seen them but is endorsing strong social support with [facility] and her sister Radha who lives next door.  ?  Dysplasia History:  2016- noted history of previous abnormal pap, no results in system  Pap 10/2017: LSIL, HPV HR neg  Colpo 2018: ECC: Fragments of squamous epithelium with koilocytic atypia (HPV  effect)  Pap 2019: HSIL, no endocervical component present (anal pap 2019: LSIL)  Colpo 2019: ECC: Insufficient. Inadequate colpo.  ?  GynHx:    LMP 2016  HIV+; ? h/o Hepatitis C- unsure. Denying other STIs  Not sexually active at this time  ?  ObHx:  FT SVDx1    x1  ?  Social:  + former tobacco use- 2 PPD x 40y- quit approx 5 years ago  + marijuana use- daily- one blunt lasts two days, helps with appetite  Denying alcohol use  Her sister Radha?will drive her to and from surgery and help take care of her afterwards  Review of Systems  Constitutional:?no fever, fatigue, weakness  Eye:?no vision loss, eye redness, drainage, or pain  ENMT:?no sore throat, ear pain, sinus pain/congestion, nasal congestion/drainage  Respiratory:?+ chronic cough, no wheezing, no shortness of breath  Cardiovascular:?no chest pain, no palpitations, no edema  Gastrointestinal:?no nausea, vomiting, or diarrhea. No abdominal pain  Genitourinary:?no dysuria, no urinary frequency or urgency, no hematuria  Hema/Lymph:?no abnormal bruising or bleeding  Endocrine:?no heat or cold intolerance, no excessive thirst or excessive urination  Musculoskeletal:?no muscle or joint pain, no joint swelling  Integumentary:?no skin rash or abnormal lesion  Neurologic: no headache, no dizziness, no weakness or numbness  Physical Exam  ???Vitals & Measurements  ??  ??BP: 173/105, repeat systolic 150s  HR: 82  AF  RR 18  BMI 21.52  General: NAD, A/Ox3. Well appearing.  Head: Atraumatic, normocephalic  Eyes: EOMI, No scleral icterus, normal conjunctivae  Respiratory: CTAB  Cardiovascular: RRR  Abdomen: soft, NTND  Skin: No rashes, warm and?dry  Extremities: no edema, no calf tenderness  ?   Exam:  External genitalia: Unremarkable external female genitalia, no masses/lesions. Normal appearing urethral meatus. Normal appearing external anus.  Speculum exam: Vaginal vault with mucosa atrophic in appearance, loss of rugae. Cervix posterior, flush  with vaginal vault.?On bimanual exam, approx 0.5cm of cervix palpated in length. No cervical motion tenderness. No adnexal fullness/tenderness. Normal urethra. Normal bladder.?  Assessment/Plan  1.?Preop examination?Z01.818  ??57yo  with cervical dysplasia scheduled for CKC on 10/3/2019. PMH significant for HIV/AIDS, HTN and depression/anxiety. GFR also noted to be low at 83, with potential mild renal insufficiency, Cr 0.9. Stenotic os noted at colposcopy visit and on todays visit. Also with noted LSIL anal pap, will ensure that patient follows up with ID as scheduled for discussion of these results. Of note pt also with noted hypokalemia on  with K+ 3.0, repeat CMP pending. Pt also with noted uncontrolled HTN, states that she did not take her BP meds this morning on time. Encouraged continued compliance with all medications.  ?  ??Reviewed risks and indication for?cold knife conization with patient.?Discussed with patient that given her flush cervix and post-menopausal status we may be limited in terms of conization sample size thus increasing chances of missing abnormal cells: will plan for cervical biopsies along entire face/ length of cervix if unable to obtain full cone specimen. We discussed the risk that not all of the abnormal cells are removed, and the possible?indication for repeat excision or hysterectomy if repeat excision is not feasible. Alternatives to this planned procedure were explained to the patient, including continued monitoring vs LEEP.? This procedure and its risks, benefits, complications (including injury to bowel, bladder, major blood vessel, ureter, bleeding, possibility of transfusion, infection, scarring, dyspareunia, erosion, further surgery, incontinence, failure of the procedure, or fistula formation). Additional risks specific to this patient and procedure include damage to rectum/bladder and leaving behind abnormal cells.?  ?  ??Patient counseled on risk of blood  transfusion including but not limited to allergic reaction, transmission of Hep C 1:2 million, transmission of Hep B 1:250,000.  ???  ??Discussed?continued smoking cessation with patient.?Emphasized that?tobacco cessation would help prevent postoperative complications, she endorses understanding and has no desire to start smoking again at this time. Also discussed with the patient that continued?cessation will help prevent future cardiovascular disease and decrease her risk of cancer. She expressed understanding.  ?  ??Discussed typical postoperative course and expectations for postoperative pain.?Discussed return precautions and importance of postoperative visit at which point pathology would be reviewed.  ?  ??[facility] Appointment requested, surgical consents signed.  ??Pre-op labs?ordered:?CMP, treponema AB, and hep panel.  ??Labs AM of surgery: T&S?  ??NPO after midnight.  SCDs for DVT prophylaxis  ??Ordered:  Comprehensive Metabolic Panel, Now collect, 09/23/19 10:20:00 CDT, Blood, Order for future visit, Stop date 09/23/19 10:20:00 CDT, Lab Collect, Preop examination  Cervical dysplasia  Hypokalemia, 09/23/19 10:20:00 CDT  Hepatitis B Surface Antigen, Routine collect, 09/23/19 10:20:00 CDT, Blood, Order for future visit, Stop date 09/23/19 10:20:00 CDT, Lab Collect, Preop examination  Cervical dysplasia  HIV (human immunodeficiency virus infection)  Hypertension, 09/23/19 10:20:00 CDT  Hepatitis C Antibody, Routine collect, 09/23/19 10:20:00 CDT, Blood, Order for future visit, Stop date 09/23/19 10:20:00 CDT, Lab Collect, Preop examination  Cervical dysplasia  HIV (human immunodeficiency virus infection)  Hypertension, 09/23/19 10:20:00 CDT  Syphilis Antibody (with Reflex RPR), Routine collect, 09/23/19 10:20:00 CDT, Blood, Order for future visit, Stop date 09/23/19 10:20:00 CDT, Lab Collect, Preop examination  Cervical dysplasia  HIV (human immunodeficiency virus infection)  Hypertension, 09/23/19  10:20:00 CDT  ? Problem List/Past Medical History  Ongoing  ??Asthma  ?Depression  ?HIV (human immunodeficiency virus infection)  ?Hypertension  Historical  ??Morbid obesity  ??Visit for screening mammogram  ??Vitamin D deficiency(  Confirmed  )  ??Well adult exam  Procedure/Surgical Historyplastic surgery- face   ?  Medications  ?albuterol 90 mcg/inh inhalation aerosol, 2 puff(s), Oral, q4-6hr, PRN, 6 refills  ?Bactrim  mg-160 mg oral tablet, 1 tab(s), Oral, Daily, 5 refills,? ?Not Taking, Completed Rx  ?Biktarvy oral tablet, 1 tab(s), Oral, Daily, 2 refills  ?guaifenesin 100 mg/5 mL oral liquid, 200 mg= 10 mL, Oral, q4hr, PRN  ?hydrochlorothiazide-lisinopril 25 mg-20 mg oral tablet, 1 tab(s), Oral, Daily, 3 refills  ?Norvasc 10 mg oral tablet, 10 mg= 1 tab(s), Oral, At Bedtime, 3 refills  ?paroxetine 20 mg oral tablet, 20 mg= 1 tab(s), Oral, Daily, 3 refills  ?traZODone 100 mg oral tablet, 100 mg= 1 tab(s), Oral, qPM  Allergies  aspirin?(Burning)  Social History  Abuse/Neglect  ?No, 07/29/2019  Alcohol - Denies Alcohol Use, 08/15/2014  ?Past, 04/28/2016  Employment/School  ?Unemployed, Highest education level: None., 06/15/2015  Exercise  ?Exercise frequency: Daily. Self assessment: Good condition. Exercise type: Walking., 01/12/2017  Home/Environment  ?Lives with Alone. Living situation: Home/Independent. Alcohol abuse in household: No. Substance abuse in household: No. Smoker in household: No. Injuries/Abuse/Neglect in household: No. Feels unsafe at home: No., 01/12/2017  Nutrition/Health  ?Type of diet: low sodium diet., 12/05/2018  ?Regular, 04/28/2016  Substance Use - Denies Substance Abuse, 08/15/2014  ?Current, Marijuana, 1-2 times per month, 01/11/2019  Tobacco - Denies Tobacco Use, 08/15/2014  ?Former smoker, quit more than 30 days ago, No, 07/29/2019  Family History  ?Hypertension.: Mother, Sister and Brother.  ?Primary malignant neoplasm of breast: Mother.  ?Primary malignant neoplasm of  prostate: Father.  ?Thyroid disease.: Mother.  + breast cancer- mother  Denying ovarian/cervical/colon cancer  Immunizations  ?Vaccine ?Date ?Status   ?influenza virus vaccine, inactivated 10/19/2018 Given   ?influenza virus vaccine, inactivated 10/11/2017 Given   ?influenza virus vaccine, inactivated 11/09/2016 Given   ?influenza virus vaccine, inactivated 12/01/2015 Given   ?pneumococcal 23-polyvalent vaccine 12/01/2015 Given   ?tetanus/diphth/pertuss (Tdap) adult/adol 08/13/2014 Recorded   ?pneumococcal 13-valent conjugate vaccine 08/13/2014 Recorded   ?influenza virus vaccine, inactivated 11/22/2013 Recorded   ?pneumococcal 23-polyvalent vaccine 12/10/2010 Recorded   ?hepatitis A adult vaccine 10/05/2010 Recorded   ?  Lab Results  ?Test Name ?Test Result ?Date/Time ?Comments   ?Sodium Lvl 140 mmol/L 09/12/2019 13:15 CDT    ?Potassium Lvl 3.0 mmol/L (Low) 09/12/2019 13:15 CDT    ?Chloride 104 mmol/L 09/12/2019 13:15 CDT    ?CO2 34 mmol/L (High) 09/12/2019 13:15 CDT    ?Calcium Lvl 8.8 mg/dL 09/12/2019 13:15 CDT    ?Glucose Lvl 66 mg/dL (Low) 09/12/2019 13:15 CDT    ?BUN 12 mg/dL 09/12/2019 13:15 CDT    ?Creatinine 0.90 mg/dL 09/12/2019 13:15 CDT    ?eGFR-AA 83 mL/min (Low) 09/12/2019 13:15 CDT    ?eGFR-SWETHA 69 mL/min (Low) 09/12/2019 13:15 CDT    ?Bili Total 0.4 mg/dL 09/12/2019 13:15 CDT    ?Bili Direct 0.1 mg/dL 09/12/2019 13:15 CDT    ?Bili Indirect 0.3 mg/dL 09/12/2019 13:15 CDT    ?AST 39 unit/L (High) 09/12/2019 13:15 CDT    ?ALT 25 unit/L 09/12/2019 13:15 CDT    ?Alk Phos 84 unit/L 09/12/2019 13:15 CDT    ?Total Protein 9.0 gm/dL (High) 09/12/2019 13:15 CDT    ?Albumin Lvl 3.4 gm/dL 09/12/2019 13:15 CDT    ?Globulin 5.60 gm/mL (High) 09/12/2019 13:15 CDT    ?A/G Ratio 0.6 ratio (Low) 09/12/2019 13:15 CDT    ?WBC 3.2 x10(3)/mcL (Low) 09/12/2019 13:15 CDT    ?RBC 4.67 x10(6)/mcL 09/12/2019 13:15 CDT    ?Hgb 13.9 gm/dL 09/12/2019 13:15 CDT    ?Hct 42.9 % 09/12/2019 13:15 CDT    ?Platelet 175 x10(3)/mcL  09/12/2019 13:15 CDT    ?MCV 91.9 fL 09/12/2019 13:15 CDT    ?MCH 29.8 pg 09/12/2019 13:15 CDT    ?MCHC 32.4 gm/dL 09/12/2019 13:15 CDT    ?RDW 13.4 % 09/12/2019 13:15 CDT    ?MPV 11.5 fL (High) 09/12/2019 13:15 CDT    ?WBC Absolute 3200.0 /mm3 (Low) 09/12/2019 13:15 CDT    ?Lymph Absolute 1472 unit/L 09/12/2019 13:15 CDT    ?Lymph Percent 46 % 09/12/2019 13:15 CDT    ?CD4 Pct 9.6 % (Low) 09/12/2019 13:15 CDT    ?CD4 Absolute 141 unit/L (Low) 09/12/2019 13:15 CDT    ?Syphilis Ab Nonreactive 06/05/2019 09:15 CDT    ?Hep A Ab Reactive (Abnormal) 06/05/2019 09:15 CDT    ?Hep A IgM Nonreactive 06/05/2019 09:15 CDT    ?Hep B Core IgM Nonreactive 06/05/2019 09:15 CDT    ?Hep Bs Ab Nonreactive 06/05/2019 09:15 CDT Non-Immune.   ?Hep Bs Ag Negative 06/05/2019 09:15 CDT    ?Hep C Ab Nonreactive 06/05/2019 09:15 CDT    ?Clue Cells NEG 06/05/2019 07:55 CDT    ?Trichomonas NEG 06/05/2019 07:55 CDT    ?WBC. NEG 06/05/2019 07:55 CDT    ?Yeast. NEG 06/05/2019 07:55 CDT       ?  ?  Addendum by Gayle Machuca MD on September 23, 2019 17:58:37 CDT (Verified)  Postop appt 10/18 at 0730.  Addendum by Dolores Mc MD on September 26, 2019 07:02:04 CDT (Verified)  58 F with cervical dysplasia  I have met this patient and agree with the above history and physical.  She understands the procedure as described above with associated risks.  All questions have been answered and consents have been signed.  Procedure: [facility]   I have reviewed above documentation and agree with plan.? I will be attending for surgery today.

## 2022-05-03 NOTE — HISTORICAL [facility] CERNER
This is a historical note converted from Katharina. Formatting and pictures may have been removed.  Please reference Katharina for original formatting and attached multimedia. Chief Complaint  follow up  History of Present Illness  Ms. Conway is a 56 yo BF presenting today for HIV f/u visit.? Admits to being off HIV meds for many months now.? Cites n/v as reason for stopping meds.? Appetite poor, loosing weight.? States praying daily, doesnt want to give up.? Has bags of meds at home, but taking only some of them intermittently.? States taking depression meds prn and has plenty at home. BP at goal.? Thrush in mouth painful, vaginal thick white discharge & itching.??Expresses desire to resume meds & will do so if appetite & n/v can be controlled.  ?   56 yo BF presents for HIV f/u visit.? Admits to not taking ART, states that she vomits them up even with Zofran prior to ART.? Pt feels confident that if she had only 1 tab, that she could keep it down.? She has already quit etoh & cigs.?Requesting?STR.?C/o bright red rectal bleeding x 1 wk, up to 1/2 cup total estimated blood loss.?Bleeding has slacked off in past couple of days.?C/o fatigue & abd pain worse after eating.? States bowels moving daily, but is broken up.? Denies diarrhea or cramping.? Describes abd pain and pressure.?? Has not completed FOBT cards, still has at home. Has upcoming appt with GYN for colpo clinic.  ?   10/11/17  56 yo BF presents for HIV f/u visit.? Admits to ongoing non-adherence to ART, states that they make her nauseated & hot flashes.? Tried taking Zofran 30min prior to ART at bedtime, but awakens with morning n/v.? Never tried taking a 2nd dose of zofran in the am.? Also reports that sugarcane farmers brought bags near her home that attracted tree rats.? Tree rats destroyed home, clothes, even eating her meds.? Treated with poison provided by feed store & paid for by farmers. Rats now eradicated per report.? States thrush has returned to  left cheek, responds best to nystatin swish & swallow.? States has never liked taking pills. 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 Amenable to flu vax today. Quit smoking x 6 yrs.? Denies shortness of breath, chest pain.  Review of Systems  ?  ?  Constitutional: negative except as stated in HPI  Eye: negative except as stated in HPI  ENMT: negative except as stated in HPI  Respiratory: negative except as stated in HPI  Cardiovascular: negative except as stated in HPI  Gastrointestinal: negative except as stated in HPI  Genitourinary: negative except as stated in HPI  Hema/Lymph: negative except as stated in HPI  Endocrine: negative except as stated in HPI  Immunologic: negative except as stated in HPI  Musculoskeletal: negative except as stated in HPI  Integumentary: negative except as stated in HPI  Neurologic: negative except as stated in HPI  ?   All Other ROS_ ?negative except as stated in HPI  Physical Exam  Vitals & Measurements  T:?37.0? ?C (Oral)? HR:?101(Peripheral)? BP:?111/77?  HT:?142?cm? HT:?142?cm? WT:?40.6?kg? WT:?40.6?kg? BMI:?20.13?  ?  ?  General: AAO X 4, afebrile  Eye: no icterus  HENT: oropharynx clear  Neck: supple  Respiratory: BBS CTA, non-labored, symmetrical  Cardiovascular: S1S2, RRR  Gastrointestinal BS + 4 quadrants, NTND, soft, no organomegaly  Genitourinary: non-tender  Lymphatics: no lymphadenopathy  Musculoskeletal: MAEW, steady gait  Integumentary: WDI  Neurologic: CN II-XII intact  Assessment/Plan  1.?HIV (human immunodeficiency virus infection)  ? extensive adherence counseling & emotional & spiritual support offered  Zofran 8mg tid for n/v  megace 20mg daily for appetite & weight gain  Biktarvy 1 po daily  Bactrim Ds 1 po daily for?PJP prophy  labs today ?  rtc 2 wks w jay  Ordered:  CBC w/ Auto Diff, Routine collect, 06/19/18 14:58:00 CDT, Blood, Order for future visit, Stop date 06/19/18 14:58:00 CDT, Lab Collect, HIV (human immunodeficiency  virus infection), 06/19/18 14:58:00 CDT  CD4 Lymphoctye Count, Routine collect, 06/19/18 14:58:00 CDT, Blood, Order for future visit, Stop date 06/19/18 14:58:00 CDT, Lab Collect, HIV (human immunodeficiency virus infection), 06/19/18 14:58:00 CDT  Clinic Follow up, *Est. 07/03/18 3:00:00 CDT, Order for future visit, HIV (human immunodeficiency virus infection), [facility]  Comprehensive Metabolic Panel, Routine collect, 06/19/18 14:58:00 CDT, Blood, Order for future visit, Stop date 06/19/18 14:58:00 CDT, Lab Collect, HIV (human immunodeficiency virus infection), 06/19/18 14:58:00 CDT  Office/Outpatient Visit Level 4 Established 73650 , HIV (human immunodeficiency virus infection)  Anorexia  Candidiasis  Nausea and vomiting, [facility], 06/19/18 14:57:00 CDT  RNA, PCR(NonGraph)rfx/GenoPRIme(R)-[facility] [id number], Routine collect, 06/19/18 14:58:00 CDT, Blood, Order for future visit, Stop date 06/19/18 14:58:00 CDT, Lab Collect, HIV (human immunodeficiency virus infection), 06/19/18 14:58:00 CDT  ?  2.?Candidiasis  ? fluconazole 200mg daily  Ordered:  Office/Outpatient Visit Level 4 Established 75811 , HIV (human immunodeficiency virus infection)  Anorexia  Candidiasis  Nausea and vomiting, [facility], 06/19/18 14:57:00 CDT  ?  3.?Anorexia  ? ensure 1 can tid with megace & zofran  Ordered:  megestrol, 800 mg = 20 mL, Oral, Daily, # 600 mL, 3 Refill(s), Pharmacy: [facility] Prescription, Ensure (butter pecan), 1 can, Oral, TID, # 1 case(s), 3 Refill(s)  Office/Outpatient Visit Level 4 Established 87159 PC, HIV (human immunodeficiency virus infection)  Anorexia  Candidiasis  Nausea and vomiting, [facility], 06/19/18 14:57:00 CDT  ?  4.?Nausea and vomiting  Ordered:  Office/Outpatient Visit Level 4 Established 33820 PC, HIV (human immunodeficiency virus infection)  Anorexia  Candidiasis  Nausea and vomiting, [facility], 06/19/18 14:57:00 CDT  ?  5.?Depression  ?  resume SSRI at home, bring all home meds to next visit  ?  Orders:  bictegravir/emtricitabine/tenofovir, 1 tab(s), Oral, Daily, # 30 tab(s), 2 Refill(s), Pharmacy: [facility]  fluconazole, 200 mg = 1 tab(s), Oral, Daily, # 30 tab(s), 2 Refill(s), Pharmacy: [facility]  ondansetron, 8 mg = 1 tab(s), Oral, q8hr, PRN PRN nausea/vomiting, allow tablet to dissolve on tongue, # 90 tab(s), 1 Refill(s), Pharmacy: [facility] - [place], [place]  sulfamethoxazole-trimethoprim, 1 tab(s), Oral, Daily, # 30 tab(s), 2 Refill(s), Pharmacy: [facility]   Problem List/Past Medical History  Ongoing  Asthma  Depression  HIV (human immunodeficiency virus infection)  Hypertension  Visit for screening mammogram  Well adult exam  Historical  Morbid obesity  Vitamin D deficiency(  Confirmed  )  Procedure/Surgical History  plastic surgery- face.  Medications  albuterol 90 mcg/inh inhalation aerosol, 2 puff(s), Oral, q4-6hr, PRN, 6 refills  Bactrim  mg-160 mg oral tablet, 1 tab(s), Oral, Daily, 2 refills  Biktarvy oral tablet, 1 tab(s), Oral, Daily, 2 refills  Ensure (butter pecan), 1 can, Oral, TID, 3 refills  fluconazole 200 mg oral tablet, 200 mg= 1 tab(s), Oral, Daily, 2 refills  hydrochlorothiazide-lisinopril 25 mg-20 mg oral tablet, 1 tab(s), Oral, Daily  K-Dur 20 oral tablet, extended release, 20 mEq= 1 tab(s), Oral, Every other day, 3 refills  Megace 40 mg/mL oral suspension, 800 mg= 20 mL, Oral, Daily, 3 refills  Norvasc 10 mg oral tablet, 10 mg= 1 tab(s), Oral, Daily, 8 refills  Zofran ODT 8 mg oral tablet, disintegrating, 8 mg= 1 tab(s), Oral, q8hr, PRN, 1 refills  Allergies  No Known Allergies  No Known Medication Allergies  Social History  Alcohol - Denies Alcohol Use, 08/15/2014  Past, 04/28/2016  Employment/School  Unemployed, Work/School description: Sits with Elderly., 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 Self assessment:  Good condition. Exercise type: Walking., 01/12/2017  Home/Environment  Lives with Alone. Living situation: Home/Independent. Alcohol abuse in household: No. Substance abuse in household: No. Smoker in household: No. Injuries/Abuse/Neglect in household: No. Feels unsafe at home: No., 01/12/2017  Nutrition/Health  Regular, 04/28/2016  Substance Abuse - Denies Substance Abuse, 08/15/2014  Tobacco - Denies Tobacco Use, 08/15/2014  Former smoker, 08/15/2014  Family History  Hypertension.: Mother, Sister and Brother.  Primary malignant neoplasm of breast: Mother.  Primary malignant neoplasm of prostate: Father.  Thyroid disease.: Mother.  Immunizations  Vaccine Date Status   influenza virus vaccine, inactivated 10/11/2017 Given   influenza virus vaccine, inactivated 11/09/2016 Given   influenza virus vaccine, inactivated 12/01/2015 Given   pneumococcal 23-polyvalent vaccine 12/01/2015 Given   tetanus/diphth/pertuss (Tdap) adult/adol 08/13/2014 Recorded   pneumococcal 13-valent conjugate vaccine 08/13/2014 Recorded   influenza virus vaccine, inactivated 11/22/2013 Recorded   pneumococcal 23-polyvalent vaccine 12/10/2010 Recorded   hepatitis A adult vaccine 10/05/2010 Recorded   Health Maintenance  Health Maintenance  ???Pending?(in the next year)  ??? ??OverDue  ??? ? ? ?Smoking Cessation due??12/19/16??and every 180??day(s)  ??? ??Due?  ??? ? ? ?Asthma Management-Asthma Education due??06/19/18??and every 6??month(s)  ??? ? ? ?Asthma Management-Wolff Peak Flow due??06/19/18??Variable frequency  ??? ? ? ?Asthma Management-Written Action Plan due??06/19/18??and every 6??month(s)  ??? ??Refused?  ??? ? ? ?Aspirin Therapy for CVD Prevention due??06/19/18??and every 1??year(s)  ??? ??Due In Future?  ??? ? ? ?Alcohol Misuse Screening not due until??07/12/18??and every 1??year(s)  ??? ? ? ?Asthma Management-Spirometry not due until??08/01/18??and every 1??year(s)  ??? ? ? ?Influenza Vaccine not due until??10/02/18??and every  1??year(s)  ??? ? ? ?Asthma Management-Asthma Medication Prescribed not due until??02/20/19??and every 1??year(s)  ??? ? ? ?Colorectal Screening not due until??05/07/19??and every 1??year(s)  ???Satisfied?(in the past 1 year)  ??? ??Satisfied?  ??? ? ? ?Alcohol Misuse Screening on??07/12/17.??Satisfied by Gianna Sagastume NP  ??? ? ? ?Asthma Management-Asthma Medication Prescribed on??02/20/18.??Satisfied by Gianna Sagastume NP  ??? ? ? ?Asthma Management-Spirometry on??08/01/17.??Satisfied by Abbie Martin  ??? ? ? ?Blood Pressure Screening on??06/19/18.??Satisfied by Tiffany Ballard  ??? ? ? ?Body Mass Index Check on??06/19/18.??Satisfied by Tiffany Ballard  ??? ? ? ?Breast Cancer Screening on??08/01/17.??Satisfied by Cami Carrillo  ??? ? ? ?Cervical Cancer Screening on??10/11/17.??Satisfied by Jessie Lauren  ??? ? ? ?Colorectal Screening on??05/07/18.??Satisfied by Aylin Cobb  ??? ? ? ?Depression Screening on??06/19/18.??Satisfied by Tiffany Ballard  ??? ? ? ?Diabetes Screening on??06/12/18.??Satisfied by Garfield Saucedo  ??? ? ? ?Influenza Vaccine on??10/11/17.??Satisfied by Wanda Bone LPN  ??? ? ? ?Obesity Screening on??06/19/18.??Satisfied by Tiffany Ballard  ??? ? ? ?Tobacco Use Screening on??06/19/18.??Satisfied by Tiffany Ballard  ??? ??Refused?  ??? ? ? ?Aspirin Therapy for CVD Prevention on??07/12/17.??Recorded for Gianna Sagastume NP??Reason: Patient Refuses  ?  ?  cervical pap 1/26/15, colpo 3/20/15, 1/15/16 PAP LGS[facility], colpo 3/16, repeat PAP 4/17 per GYN, 10/2017 [facility] colpo clinic 12/8/17  mammogram 3/13/15, 4/16 cat 2 benign, 8/17 cat 1 benign  anal pap 12/1/15 ASCUS, 10/17 NIL  anal ct/gc 12/1/15 neg, 10/17 neg  oral ct/gc 12/1/15 neg, 10/17 neg  cervical/vag ct/gc 1/15/16 neg, 10/17 neg  ophth 2/18/16  Dexa 2/15 NL  Lab Results  Test Name Test Result Date/Time Comments   Creatinine 1.00 mg/dL 06/12/2018 00:00  CDT    AST 21 unit/L 06/12/2018 00:00 CDT    ALT 16 unit/L 06/12/2018 00:00 CDT    Test Name Pancho 10/11/2017 13:58 CDT <br/>Fungitell<br/>[id number]   Test Result <31 pg/mL 10/11/2017 13:58 CDT <br/> Result &lt;31 pg/mL &lt;80 01<br/> Interpretation: The Fungitell assay does not detect<br/> certain<br/> fungal species such as the genus Cryptococcus (Khalida et<br/> al. 1991) which produces very low levels of<br/> (1-3)-Beta-D-Glucan. The assay also does not detect the<br/> Zygomycetes such as Absidia, Mucor and Rhizopus (Pallavi<br/> et<br/> al. 1994) which are not known to produce<br/> (1-3)-Beta-D-Glucan. In addition, the yeast phase of<br/> Blastomyces dermatitidis produces little<br/> (1-3)-Beta-D-Glucan and may not be detected by the assay<br/> (Dennys et al. 2007).<br/><br/>Reference Range:<br/> Less than 60 pg/mL. Glucan values of less than 60 pg/mL<br/> are<br/> interpreted as negative.<br/> Glucan values of 60 to 79 pg/mL are interpreted as<br/> indeterminate, and suggest a possible fungal infection.<br/> Additional sampling and testing of sera is required to<br/> interpret the results.<br/> Glucan values of greater than or equal to 80 pg/mL are<br/> interpreted as positive.<br/> Due to the potential for environmental contamination when<br/> transferred to pour-off tubes, which can lead to false<br/> positive results, interpret positive results from samples<br/> provided in pour-off tubes with caution. Results should<br/> be used in conjunction with clinical findings, and should not<br/> form the sole basis for a diagnosis or treatment decision.<br/> The Fungitell test is approved or cleared for in vitro<br/> diagnostic use by the [place] Food and Drug Administration.<br/> Modifications to the approved package insert have been<br/> made and the performance characterististics for these modifications were determined by [employer].<br/> If sample result is greater than 500 pg/mL, physician may<br/> order a  titer of the sample. Please contact [facility]br/> [employer] if you would like to order a retest of this<br/> sample<br/> to obtain an actual value. Samples are held for 1 week<br/> after initial testing date.<br/><br/>Performed by: [facility]<br/>[address] [facility], [place] [address]<br/>Dir: Tiffany De Oliveira, PhD<br/><br/>[facility]<br/>[address] [facility], [place] [address]<br/>Dir: Gustavo De Oliveira, PhD   WBC 4.1 x10(3)/mcL (Low) 06/12/2018 00:00 CDT    Hgb 12.7 gm/dL 06/12/2018 00:00 CDT    Hct 38.4 % 06/12/2018 00:00 CDT    Platelet 131 x10(3)/mcL 06/12/2018 00:00 CDT    CD4 Pct 9.0 % (Low) 10/11/2017 13:58 CDT    CD4 Absolute 178 unit/L (Low) 10/11/2017 13:58 CDT    Cryptococcus Ag-LC Negative 10/11/2017 13:58 CDT Performed At: [facility]<br/>[address] [place] [id number]<br/>Isabell HURTADO MD Ph:[phone]   Chlam trach PCR NOT DETECTED. 10/11/2017 13:44 CDT    N. gonorrhea PCR NOT DETECTED. 10/11/2017 13:44 CDT    Chl Trach-Rectal-LC Negative 10/11/2017 13:44 CDT <br/>This test was developed and its performance characteristics<br/>determined by [employer] [facility]. It has not been cleared or<br/>approved by the Food and Drug Administration.   N gonor-Rectal-LC Negative 10/11/2017 13:44 CDT <br/>This test was developed and its performance characteristics<br/>determined by [facility]. It has not been cleared or<br/>approved by the Food and Drug Administration.<br/>Performed At: [facility]<br/>[address] [place] [id number]<br/>Isabell HURTADO MD Ph:[phone]   Chl Hzmgf-Yrkgih-SV Negative 10/11/2017 13:44 CDT <br/>This test was developed and its performance characteristics<br/>determined by [facility]. It has not been cleared or<br/>approved by the Food and Drug Administration.   N exwcl-Revfxi-DG Negative 10/11/2017 13:44 CDT <br/>This test was developed and its performance characteristics<br/>determined by [facility]. It has  not been cleared or<br/>approved by the Food and Drug Administration.<br/>Performed At:  [facility]<br/>[address], [place] [id number]<br/>Isabell HURTADO MD Ph:[phone]   HIV1 RNA PCR-LC [id number] cpy/mL 10/11/2017 13:58 CDT <br/>The reportable range for this assay is 20 to 10,000,000<br/>copies HIV-1 RNA/mL.      [1]?Office Visit Note; Mayuri Cortes 11/20/2017 12:06 CST

## 2022-05-03 NOTE — HISTORICAL [facility] CERNER
This is a historical note converted from Katharina. Formatting and pictures may have been removed.  Please reference Katharina for original formatting and attached multimedia. Chief Complaint  b20  History of Present Illness  Rand is a 59 yo BF presenting today for HIV f/u visit with longstanding history of noncompliance to treatment plans.? Reports resurgence of thrush in mouth, trouble swallowing food & altered taste of food. 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 Thinking about purchasing a safe to keep her medications safe from tree rats.?? Rats do not invade her home refrigerator, will store meds there.? Cough persistent for some months, accompanied by dyspnea with exertion.? Endorses weight loss.? No fever/chills.? No visual changes.? Noting some temporary memory issues.? Cries frequently, denies SI/HI.? Anticipating visit from her daughter that resides in [place], will be here in 2 weeks.? Taking BP meds daily, controls headaches.  ?  12/5/18  Ms. Conway is a 59 yo BF presenting today for HIV f/u visit.? States that she is not taking her HIV meds, as she continues to goodson rodents in her home.? States that the rats eat her meds through the plastic bottles, urinate on the refrigerator, and roaches are biting her skin.? States that the rats brought in by a local garden are talking to her & laughing at her.? Two separate social workers have visited her home, have not observed rodents in the home or?the deplorable conditions as described by pt.??Mental health status deteriorating each sequential visit and has been directly affecting?pts ability to care for physical health needs as well.? Denies?suicidal or homicidal ideation. ?BP elevated, not taking meds.? Endorses hair loss, poor appetite, weight loss.? Denies fever & chills.? Insect bites noted to bilat upper extremities.  ?   10/19/18  Rand is a 59 yo BF presenting today for HIV f/u visit.? Has not taken any meds for  many months.? States that home is rat, raccoon, and insect infested home.? Water has been turned off, does not have the funds to restore.? States that it rains in the home & there are holes in the walls by rats.? Planning to move out of home within next few weeks.? Thorne noted on pts clothing.? Pruritic rash noted to arms & torso, small bites.? Cough noted, non-productive.? Weight loss.? Poor appetite.? Discussed alternative living options such as assisted living/nursing home on temporary basis, states that she has no one to watch her 2 small dogs that are her family.? Daughter lives in [place], not aware of pts condition.?[facility]  [facility].? ?Amenable to flu vax today.  Review of Systems  ?  ?  Constitutional: negative except as stated in HPI  Eye: negative except as stated in HPI  ENMT: negative except as stated in HPI  Respiratory: negative except as stated in HPI  Cardiovascular: negative except as stated in HPI  Gastrointestinal: negative except as stated in HPI  Genitourinary: negative except as stated in HPI  Hema/Lymph: negative except as stated in HPI  Endocrine: negative except as stated in HPI  Immunologic: negative except as stated in HPI  Musculoskeletal: negative except as stated in HPI  Integumentary: negative except as stated in HPI  Neurologic: negative except as stated in HPI  ?   All Other ROS_ ?negative except as stated in HPI  Physical Exam  Vitals & Measurements  T:?36.9? ?C (Oral)? HR:?75(Peripheral)? RR:?16? BP:?136/81? SpO2:?100%?  HT:?142?cm? WT:?45.6?kg? BMI:?22.61?  ?  ?  General: AAO X 4, afebrile  Eye: no icterus  HENT: oropharyngeal thrush noted  Neck: supple  Respiratory: BBS CTA, non-labored, symmetrical, O2 sat 100%  Cardiovascular: S1S2, RRR  Gastrointestinal BS + 4 quadrants, NTND, soft, no organomegaly  Genitourinary: non-tender  Lymphatics:ant/post cervical?lymphadenopathy, soft, mobile, NT  Musculoskeletal: MAEW, steady gait  Integumentary:  WDI  Neurologic: CN II-XII intact  Assessment/Plan  1.?HIV (human immunodeficiency virus infection)  ? extensive adherence counseling done and eventual fatal outcome of continued nonadherence, expresses desire to adhere to treatment plan & intention to do so  resume Biktarvy 1?po daily, Bactrim?DS?1 po daily  ?labs today  rtc 6 wks w Mayuri  pap smears next visit  Ordered:  .Cd4 Lymphocytes, Routine collect, 04/15/19 14:32:00 CDT, Blood, Stop date 04/15/19 14:32:00 CDT, Lab Collect, HIV (human immunodeficiency virus infection), 04/15/19 14:32:00 CDT  1160F- Medication reconciliation completed during visit, HIV (human immunodeficiency virus infection)  Cough  Candidiasis  Anorexia  Hypertension  Depression, [facility], 04/15/19 14:23:00 CDT  Aspergillus Galactomannan Antigen-[facility] [id number], Routine collect, 04/15/19 14:32:00 CDT, Blood, Stop date 04/15/19 14:32:00 CDT, Lab Collect, HIV (human immunodeficiency virus infection), 04/15/19 14:32:00 CDT  CBC w/ Auto Diff, Now collect, 04/15/19 14:32:00 CDT, Blood, Stop date 04/15/19 14:32:00 CDT, Lab Collect, HIV (human immunodeficiency virus infection), 04/15/19 14:32:00 CDT  Clinic Follow up, *Est. 05/20/19 14:00:00 CDT, Order for future visit, HIV (human immunodeficiency virus infection), [facility]  Comprehensive Metabolic Panel, Routine collect, 04/15/19 14:32:00 CDT, Blood, Stop date 04/15/19 14:32:00 CDT, Lab Collect, HIV (human immunodeficiency virus infection), 04/15/19 14:32:00 CDT  Cryptococcus Antigen, Serum-[facility] [id number], Routine collect, 04/15/19 14:32:00 CDT, Blood, Stop date 04/15/19 14:32:00 CDT, Lab Collect, HIV (human immunodeficiency virus infection), 04/15/19 14:32:00 CDT  Cytomegalovirus DNA Quantitation by PCR-[facility] [id number], Routine collect, 04/15/19 14:33:00 CDT, Blood, Stop date 04/15/19 14:33:00 CDT, Lab Collect, HIV (human immunodeficiency virus infection), 04/15/19 14:33:00 CDT  Fungitell,Serum-[facility] [id number], Routine collect,  04/15/19 14:32:00 CDT, Blood, Stop date 04/15/19 14:32:00 CDT, Lab Collect, HIV (human immunodeficiency virus infection), 04/15/19 14:32:00 CDT  Gram Stain, Routine collect, 04/15/19 14:22:00 CDT, Order for future visit, Sputum, Nurse collect, Stop date 04/15/19 14:22:00 CDT, HIV (human immunodeficiency virus infection)  Histoplasma Galmannan Ag Urine-[facility] [id number], Routine collect, Urine, Order for future visit, 04/15/19 14:22:00 CDT, Stop date 04/15/19 14:22:00 CDT, Nurse collect, HIV (human immunodeficiency virus infection), 04/15/19 14:22:00 CDT  Miscellaneous Lab Test, Routine collect, Urine, 04/15/19 14:22:00 CDT, Order for future visit, blastomyces, Lab Collect, Stop date 04/15/19 14:22:00 CDT, HIV (human immunodeficiency virus infection)  Office/Outpatient Visit Level 5 Established 72618 PC, HIV (human immunodeficiency virus infection)  Cough  Candidiasis  Anorexia  Hypertension  Depression, [facility], 04/15/19 14:24:00 CDT  Respiratory Culture, Routine collect, 04/15/19 14:22:00 CDT, Order for future visit, Sputum, Nurse collect, Stop date 04/15/19 14:22:00 CDT, HIV (human immunodeficiency virus infection)  RNA, PCR(NonGraph)rfx/GenoPRIme(R)-[facility] [id number], Routine collect, 04/15/19 14:32:00 CDT, Blood, Stop date 04/15/19 14:32:00 CDT, Lab Collect, HIV (human immunodeficiency virus infection), 04/15/19 14:32:00 CDT  Syphilis Antibody (with Reflex RPR), Routine collect, 04/15/19 14:32:00 CDT, Blood, Stop date 04/15/19 14:32:00 CDT, Lab Collect, HIV (human immunodeficiency virus infection), 04/15/19 14:32:00 CDT  Toxoplasma gondii by PCR-[facility] [id number], Routine collect, 04/15/19 14:32:00 CDT, Blood, Stop date 04/15/19 14:32:00 CDT, Lab Collect, HIV (human immunodeficiency virus infection), 04/15/19 14:32:00 CDT  ?  2.?Cough  ???sputum culture, fungitell, ur histo/blasto, galactomannin, cmv pcr, toxo pcr today ?  CXR today  Ordered:  1160F- Medication reconciliation completed during visit, HIV (human  immunodeficiency virus infection)  Cough  Candidiasis  Anorexia  Hypertension  Depression, [facility], 04/15/19 14:23:00 CDT  Office/Outpatient Visit Level 5 Established 65273 PC, HIV (human immunodeficiency virus infection)  Cough  Candidiasis  Anorexia  Hypertension  Depression, [facility], 04/15/19 14:24:00 CDT  XR Chest 2 Views, Routine, 04/15/19 14:23:00 CDT, COPD, None, Ambulatory, Rad Type, Order for future visit, Cough, Not Scheduled, 04/15/19 14:23:00 CDT  ?  3.?Candidiasis  ? fluconazole 200 mg/day  Ordered:  1160F- Medication reconciliation completed during visit, HIV (human immunodeficiency virus infection)  Cough  Candidiasis  Anorexia  Hypertension  Depression, [facility], 04/15/19 14:23:00 CDT  Office/Outpatient Visit Level 5 Established 51119 PC, HIV (human immunodeficiency virus infection)  Cough  Candidiasis  Anorexia  Hypertension  Depression, [facility], 04/15/19 14:24:00 CDT  ?  4.?Depression  ? paxil 20mg/day  Ordered:  1160F- Medication reconciliation completed during visit, HIV (human immunodeficiency virus infection)  Cough  Candidiasis  Anorexia  Hypertension  Depression, [facility], 04/15/19 14:23:00 CDT  Office/Outpatient Visit Level 5 Established 03747 PC, HIV (human immunodeficiency virus infection)  Cough  Candidiasis  Anorexia  Hypertension  Depression, [facility], 04/15/19 14:24:00 CDT  ?  5.?Hypertension  ? controlled  cont amlodipine 10mg/day & hctz/lisinopril daily  Ordered:  amLODIPine, 10 mg = 1 tab(s), Oral, At Bedtime, # 30 tab(s), 3 Refill(s), Pharmacy: [facility] [place], [place]  hydrochlorothiazide-lisinopril, 1 tab(s), Oral, Daily, # 30 tab(s), 3 Refill(s), Pharmacy: [facility] [place]  1160F- Medication reconciliation completed during visit, HIV (human immunodeficiency virus infection)  Cough  Candidiasis  Anorexia  Hypertension  Depression, [facility], 04/15/19 14:23:00 CDT  Office/Outpatient Visit Level 5  Established 59122 PC, HIV (human immunodeficiency virus infection)  Cough  Candidiasis  Anorexia  Hypertension  Depression, [facility], 04/15/19 14:24:00 CDT  ?  Orders:  bictegravir/emtricitabine/tenofovir, 1 tab(s), Oral, Daily, # 30 tab(s), 1 Refill(s), Pharmacy: [place]  fluconazole, 200 mg = 1 tab(s), Oral, Daily, # 30 tab(s), 1 Refill(s), Pharmacy: [place]  megestrol, 800 mg = 20 mL, Oral, Daily, # 600 mL, 3 Refill(s), Pharmacy: [place]  ondansetron, 8 mg = 1 tab(s), Oral, q8hr, PRN PRN nausea/vomiting, allow tablet to dissolve on tongue, # 90 tab(s), 1 Refill(s), Pharmacy: [place]  PARoxetine, 20 mg = 1 tab(s), Oral, Daily, # 30 tab(s), 3 Refill(s), Pharmacy: [place]  sulfamethoxazole-trimethoprim, 1 tab(s), Oral, Daily, # 30 tab(s), 5 Refill(s), Pharmacy: [place]   Problem List/Past Medical History  Ongoing  Asthma  Depression  HIV (human immunodeficiency virus infection)  Hypertension  Visit for screening mammogram  Well adult exam  Historical  Morbid obesity  Vitamin D deficiency(  Confirmed  )  Procedure/Surgical History  plastic surgery- face   Medications  albuterol 90 mcg/inh inhalation aerosol, 2 puff(s), Oral, q4-6hr, PRN, 6 refills  Bactrim  mg-160 mg oral tablet, 1 tab(s), Oral, Daily, 5 refills  Biktarvy oral tablet, 1 tab(s), Oral, Daily, 1 refills  Ensure (butter pecan), 1 can, Oral, TID, 3 refills  fluconazole 200 mg oral tablet, 200 mg= 1 tab(s), Oral, Daily, 1 refills  hydrochlorothiazide-lisinopril 25 mg-20 mg oral tablet, 1 tab(s), Oral, Daily, 3 refills  LORATADINE TAB 10MG, 10 mg= 1 tab(s), Oral, Daily  Megace 40 mg/mL oral suspension, 800 mg= 20 mL, Oral, Daily, 3 refills  Norvasc 10 mg oral tablet, 10 mg= 1 tab(s), Oral, At Bedtime, 3 refills  paroxetine 20 mg oral tablet, 20 mg= 1 tab(s), Oral, Daily, 3  refills  PERMETHRIN CRE 5%  TRAZODONE TAB 100MG, 100 mg= 1 tab(s), Oral, qPM  Zofran ODT 8 mg oral tablet, disintegrating, 8 mg= 1 tab(s), Oral, q8hr, PRN, 1 refills  Allergies  No Known Allergies  No Known Medication Allergies  Social History  Alcohol - Denies Alcohol Use, 08/15/2014  Past, 04/28/2016  Employment/School  Unemployed, Highest education level: None., 06/15/2015  Exercise  Exercise frequency: Daily. Self assessment: Good condition. Exercise type: Walking., 01/12/2017  Home/Environment  Lives with Alone. Living situation: Home/Independent. Alcohol abuse in household: No. Substance abuse in household: No. Smoker in household: No. Injuries/Abuse/Neglect in household: No. Feels unsafe at home: No., 01/12/2017  Nutrition/Health  Type of diet: low sodium diet., 12/05/2018  Regular, 04/28/2016  Substance Abuse - Denies Substance Abuse, 08/15/2014  Current, Marijuana, 1-2 times per month, 01/11/2019  Tobacco - Denies Tobacco Use, 08/15/2014  Never (less than 100 in lifetime), N/A, 04/15/2019  Family History  Hypertension.: Mother, Sister and Brother.  Primary malignant neoplasm of breast: Mother.  Primary malignant neoplasm of prostate: Father.  Thyroid disease.: Mother.  Immunizations  Vaccine Date Status   influenza virus vaccine, inactivated 10/19/2018 Given   influenza virus vaccine, inactivated 10/11/2017 Given   influenza virus vaccine, inactivated 11/09/2016 Given   influenza virus vaccine, inactivated 12/01/2015 Given   pneumococcal 23-polyvalent vaccine 12/01/2015 Given   tetanus/diphth/pertuss (Tdap) adult/adol 08/13/2014 Recorded   pneumococcal 13-valent conjugate vaccine 08/13/2014 Recorded   influenza virus vaccine, inactivated 11/22/2013 Recorded   pneumococcal 23-polyvalent vaccine 12/10/2010 Recorded   hepatitis A adult vaccine 10/05/2010 Recorded   Health Maintenance  Health Maintenance  ???Pending?(in the next year)  ??? ??OverDue  ??? ? ? ?Diabetes Screening due??and every?  ??? ? ? ?Smoking  Cessation due??12/19/16??and every 180??day(s)  ??? ? ? ?Alcohol Misuse Screening due??07/12/18??and every 1??year(s)  ??? ? ? ?Asthma Management-Spirometry due??08/01/18??and every 1??year(s)  ??? ??Due?  ??? ? ? ?Asthma Management-Asthma Education due??04/15/19??and every 6??month(s)  ??? ? ? ?Asthma Management-Wolff Peak Flow due??04/15/19??Variable frequency  ??? ? ? ?Asthma Management-Written Action Plan due??04/15/19??and every 6??month(s)  ??? ??Refused?  ??? ? ? ?Aspirin Therapy for CVD Prevention due??04/15/19??and every 1??year(s)  ??? ??Due In Future?  ??? ? ? ?Breast Cancer Screening not due until??08/01/19??and every 2??year(s)  ??? ? ? ?Colorectal Screening not due until??10/19/19??and every 1??year(s)  ??? ? ? ?Hypertension Management-BMP not due until??01/11/20??and every 1??year(s)  ??? ? ? ?Blood Pressure Screening not due until??04/14/20??and every 1??year(s)  ??? ? ? ?Body Mass Index Check not due until??04/14/20??and every 1??year(s)  ??? ? ? ?Hypertension Management-Blood Pressure not due until??04/14/20??and every 1??year(s)  ???Satisfied?(in the past 1 year)  ??? ??Satisfied?  ??? ? ? ?ADL Screening on??04/15/19.??Satisfied by Mandi Serna LPN  ??? ? ? ?Blood Pressure Screening on??04/15/19.??Satisfied by Mandi Serna LPN  ??? ? ? ?Body Mass Index Check on??04/15/19.??Satisfied by Mandi Serna LPN  ??? ? ? ?Colorectal Screening on??10/19/18.??Satisfied by Halley Tavarez  ??? ? ? ?Depression Screening on??01/11/19.??Satisfied by Júnior Espinoza LPN  ??? ? ? ?Diabetes Screening on??01/11/19.??Satisfied by Michelle Chi  ??? ? ? ?Hypertension Management-Blood Pressure on??04/15/19.??Satisfied by Mandi Serna LPN  ??? ? ? ?Influenza Vaccine on??10/19/18.??Satisfied by Prashant Martin  ??? ? ? ?Lipid Screening on??06/19/18.??Satisfied by Susi Silva  ??? ? ? ?Obesity Screening on??04/15/19.??Satisfied by Mandi Serna LPN  ?  ?  cervical pap 1/26/15, colpo 3/20/15,  1/15/16 PAP [facility], colpo 3/16, repeat PAP 4/17 per GYN, 10/2017 [facility] colpo clinic 12/8/17  mammogram 3/13/15, 4/16 cat 2 benign, 8/17 cat 1 benign  anal pap 12/1/15 ASCUS, 10/17 NIL  anal ct/gc 12/1/15 neg, 10/17 neg  oral ct/gc 12/1/15 neg, 10/17 neg  cervical/vag ct/gc 1/15/16 neg, 10/17 neg  ophth 2/18/16  Dexa 2/15 NL  Lab Results  Test Name Test Result Date/Time Comments   Creatinine 1.30 mg/dL 01/11/2019 14:29 CST    eGFR-AA 54 mL/min (Low) 01/11/2019 14:29 CST    AST 31 unit/L 01/11/2019 14:29 CST    ALT 27 unit/L 01/11/2019 14:29 CST    Hgb A1c 6.1 % 06/19/2018 15:55 CDT    Chol 153 mg/dL 06/19/2018 15:55 CDT    HDL 35 mg/dL (Low) 06/19/2018 15:55 CDT    Trig 171 mg/dL (High) 06/19/2018 15:55 CDT    LDL 84 mg/dL 06/19/2018 15:55 CDT    Chol/HDL 4.4 06/19/2018 15:55 CDT    T4 Free 0.84 ng/dL 12/05/2018 15:58 CST    TSH 0.909 mIU/L 12/05/2018 15:58 CST    WBC 4.8 x10(3)/mcL 01/11/2019 14:29 CST    Hgb 13.1 gm/dL 01/11/2019 14:29 CST    Hct 39.8 % 01/11/2019 14:29 CST    Platelet 129 x10(3)/mcL (Low) 01/11/2019 14:29 CST    CD4 Pct 10.1 % (Low) 10/19/2018 14:51 CDT    CD4 Absolute 173 unit/L (Low) 10/19/2018 14:51 CDT    CMV DNA by PCR-LC [id number] IU/mL 10/19/2018 14:51 CDT ?  The quantitative range of this assay is 200 to 1 million  IU/mL.  ?  This test was developed and its performance characteristics  determined by [employer]. It has not been cleared or approved  by the Food and Drug Administration. The FDA has determined  that such clearance or approval is not necessary.   HIV1 RNA PCR-LC [id number] cpy/mL 10/19/2018 14:51 CDT ?  The reportable range for this assay is 20 to 10,000,000  copies HIV-1 RNA/mL.      [1]?Office Visit Note; Mayuri Cortes 10/19/2018 14:26 CDT

## 2022-05-03 NOTE — HISTORICAL [facility] CERNER
This is a historical note converted from [facility]. Formatting and pictures may have been removed.  Please reference Cermark for original formatting and attached multimedia. Chief Complaint  b20 f/u  History of Present Illness  Rand is a 59 yo BF presenting today for HIV f/u visit.? Admits to not taking any meds as prescribed, continues to struggle with mental health concerns.? States that her 16 yo grandson passed away in a kayaking accident in [place] a few months ago.? C/o skin breakdown in corners of lips, thrush in mouth. Admits that she does have fluconazole at home in her fridge, did not realize that it is for thrush.? Spent extensive amount of time stressing importance of taking meds as prescribed, verbalized understanding & intent to do so.? Acknowledges that we have had this conversation at every visit for several years, and that she does not follow through at home.? Expresses conviction to do so this time.? C/o chronic cough, did not attend CT of chest appointment as previously scheduled.? C/o frequent headaches, floaters, memory loss.? Agrees to attend appts for MRI of brain & CT of chest when scheduled.? Attended appts for colpo, mmg, & dexa 6/2019, reports reviewed.? BP above goal, but is not taking prescribed medications.? Strongly encouraged pt to seek MH evaluation at [facility] for grief & depression, states that she will get her  at [facility] to assist.? She is non-toxic in appearance, and is aware of dire consequences of continued non-adherence to treatment plan recommendations.  ?  6/5/19  Rand is a 59 yo BF presenting today for HIV f/u visit.? States that she has not been taking any of her medications.? C/o dry & ?itchy skin, hair loss, white film on tongue, weight loss.? Discussed with patient in depth that symptoms are directly related to uncontrolled HIV/AIDS and will not resolve until or unless she starts to adhere to treatment plan.? BP above goal, denies HA or CP.? Cough is chronic &  persistent despite treatment with antibiotics & repeat CXR normalized.? Comprehensive ID work up negative for aspergillosis, blastomycoses, histoplasmosis, PJP, crypococcal disease, toxoplasmosis, and CMV.? Completed treatment for multi-bacterial pneumonia, CXR cleared, but cough persists.? She has quit smoking 5 yrs ago.?Does clean?with bleach in her home?to clean for rats that have invaded her home.?Plans to see a  provider, wants to have her dogs registered as support dogs.? Trying to travel to [place] to see daughter, will need to bring them via bus. Amenable to recommended pap smears today per routine.? Vaccinations deferred until CD4 restoration.? Agrees to fundus photo today.  ?   4/15/19  Rand is a 57 yo BF presenting today for HIV f/u visit with longstanding history of noncompliance to treatment plans.? Reports resurgence of thrush in mouth, trouble swallowing food & altered taste of food. 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 Thinking about purchasing a safe to keep her medications safe from tree rats.?? Rats do not invade her home refrigerator, will store meds there.? Cough persistent for some months, accompanied by dyspnea with exertion.? Endorses weight loss.? No fever/chills.? No visual changes.? Noting some temporary memory issues.? Cries frequently, denies SI/HI.? Anticipating visit from her daughter that resides in [place], will be here in 2 weeks.? Taking BP meds daily, controls headaches.  Review of Systems  ?  ?  Constitutional: negative except as stated in HPI  Eye: negative except as stated in HPI  ENMT: negative except as stated in HPI  Respiratory: negative except as stated in HPI  Cardiovascular: negative except as stated in HPI  Gastrointestinal: negative except as stated in HPI  Genitourinary: negative except as stated in HPI  Hema/Lymph: negative except as stated in HPI  Endocrine: negative except as stated in HPI  Immunologic: negative  except as stated in HPI  Musculoskeletal: negative except as stated in HPI  Integumentary: negative except as stated in HPI  Neurologic: negative except as stated in HPI  ?   All Other ROS_ ?negative except as stated in HPI  Physical Exam  Vitals & Measurements  T:?36.8? ?C (Oral)? HR:?73(Peripheral)? RR:?16? BP:?144/91? SpO2:?99%?  HT:?142?cm? WT:?43.8?kg? BMI:?21.72?  ?  ?  General: AAO X 4, afebrile  Eye: no icterus  HENT: angular chelitis & oropharyngeal thrush noted  Neck: supple  Respiratory: BBS CTA, non-labored, symmetrical  Cardiovascular: S1S2, RRR  Gastrointestinal BS + 4 quadrants, NTND, soft, no organomegaly  Genitourinary: non-tender  Lymphatics: bilat ant/post cervical lymphadenopathy, soft, mobile, NT  Musculoskeletal: MAEW, steady gait  Integumentary: WDI  Neurologic: CN II-XII intact  Assessment/Plan  1.?HIV (human immunodeficiency virus infection)?B20  ?extensive adherence counseling done  Biktarvy 1 po daily & Bactrim DS 1 po daily as previously prescribed  labs today  rtc 6 wks w Mayuri  Ordered:  .Cd4 Lymphocytes, Routine collect, 09/12/19 12:56:00 CDT, Blood, Stop date 09/12/19 12:56:00 CDT, Lab Collect, HIV (human immunodeficiency virus infection), 09/12/19 12:56:00 CDT  1160F- Medication reconciliation completed during visit, HIV (human immunodeficiency virus infection)  Hypertension  Chronic cough, [facility] C, 09/12/19 12:47:00 CDT  CBC w/ Auto Diff, Now collect, 09/12/19 12:56:00 CDT, Blood, Stop date 09/12/19 12:56:00 CDT, Lab Collect, HIV (human immunodeficiency virus infection), 09/12/19 12:56:00 CDT  Clinic Follow up, *Est. 10/24/19 10:20:00 CDT, Order for future visit, HIV (human immunodeficiency virus infection), [facility]  CT Thorax W W/O Contrast, Routine, 09/12/19 12:47:00 CDT, Other (please specify), AIDS, chronic cough, None, Ambulatory, Rad Type, Order for future visit, Chronic cough  HIV (human immunodeficiency virus infection), Schedule this test, [facility], 09/12/1...  Internal Referral to [facility], fundus photo today, *Est. 09/12/19 13:15:00 CDT, Future Visit?, HIV (human immunodeficiency virus infection)  Manual Diff, Now collect, 09/12/19 13:15:00 CDT, Blood, Collected, Stop date 09/12/19 13:15:00 CDT, Lab Collect, HIV (human immunodeficiency virus infection), 09/12/19 12:56:00 CDT  MRI Brain W W/O Contrast, Routine, 09/12/19 12:49:00 CDT, Other (please specify), AIDS, memory loss, headaches, None, Ambulatory, Rad Type, Order for future visit, HIV (human immunodeficiency virus infection)  Memory loss  Headache, Schedule this test, [facility] an...  Office/Outpatient Visit Level 4 Established 04126 , HIV (human immunodeficiency virus infection)  Hypertension  Chronic cough, [facility], 09/12/19 12:47:00 CDT  RNA, PCR(NonGraph)rfx/GenoPRIme(R)-[facility] [id number], Routine collect, 09/12/19 12:56:00 CDT, Blood, Stop date 09/12/19 12:56:00 CDT, Lab Collect, HIV (human immunodeficiency virus infection), 09/12/19 12:56:00 CDT  ?  2.?Hypertension?I10  ?above goal  low salt diet  counseled pt on importance of taking meds as rxd  Ordered:  1160F- Medication reconciliation completed during visit, HIV (human immunodeficiency virus infection)  Hypertension  Chronic cough, [facility], 09/12/19 12:47:00 CDT  Office/Outpatient Visit Level 4 Established 34511 , HIV (human immunodeficiency virus infection)  Hypertension  Chronic cough, [facility], 09/12/19 12:47:00 CDT  ?  3.?Chronic cough?R05  ?CT chest w & w/o contrast  ID w/u negative, CXR neg, AIDS  CT chest as recommended by ID staff  Ordered:  1160F- Medication reconciliation completed during visit, HIV (human immunodeficiency virus infection)  Hypertension  Chronic cough, [facility], 09/12/19 12:47:00 CDT  CT Thorax W W/O Contrast, Routine, 09/12/19 12:47:00 CDT, Other (please specify), AIDS, chronic cough, None, Ambulatory, Rad Type, Order for future visit,  Chronic cough  HIV (human immunodeficiency virus infection), Schedule this test, [facility], 09/12/1...  Office/Outpatient Visit Level 4 Established 13754 PC, HIV (human immunodeficiency virus infection)  Hypertension  Chronic cough, [facility] C, 09/12/19 12:47:00 CDT  ?  4.?Headache?R51  floaters, memory loss, AIDS?  MRI brain  Ordered:  Cytomegalovirus DNA Quantitation by PCR-[facility] [id number], Routine collect, 09/12/19 12:56:00 CDT, Blood, Stop date 09/12/19 12:56:00 CDT, Lab Collect, Headache, 09/12/19 12:56:00 CDT  MRI Brain W W/O Contrast, Routine, 09/12/19 12:49:00 CDT, Other (please specify), AIDS, memory loss, headaches, None, Ambulatory, Rad Type, Order for future visit, HIV (human immunodeficiency virus infection)  Memory loss  Headache, Schedule this test, [facility] an...  Toxoplasma gondii by PCR-[facility] [id number], Routine collect, 09/12/19 12:56:00 CDT, Blood, Stop date 09/12/19 12:56:00 CDT, Lab Collect, Headache, 09/12/19 12:56:00 CDT  ?  5.?Memory loss?R41.3  Ordered:  MRI Brain W W/O Contrast, Routine, 09/12/19 12:49:00 CDT, Other (please specify), AIDS, memory loss, headaches, None, Ambulatory, Rad Type, Order for future visit, HIV (human immunodeficiency virus infection)  Memory loss  Headache, Schedule this test, [facility] an...  ?  Referrals  Internal Referral to [facility], fundus photo today, *Est. 09/12/19 13:15:00 CDT, Future Visit?, HIV (human immunodeficiency virus infection)  Clinic Follow up, *Est. 10/24/19 10:20:00 CDT, Order for future visit, HIV (human immunodeficiency virus infection), [facility]   Problem List/Past Medical History  Ongoing  Asthma  Depression  HIV (human immunodeficiency virus infection)  Hypertension  Historical  Morbid obesity  Visit for screening mammogram  Vitamin D deficiency(  Confirmed  )  Well adult exam  Procedure/Surgical History  plastic surgery- face   Medications  albuterol 90 mcg/inh  inhalation aerosol, 2 puff(s), Oral, q4-6hr, PRN, 6 refills  Bactrim  mg-160 mg oral tablet, 1 tab(s), Oral, Daily, 5 refills,? ?Not Taking, Completed Rx  Biktarvy oral tablet, 1 tab(s), Oral, Daily, 2 refills  fluconazole 200 mg oral tablet, 200 mg= 1 tab(s), Oral, Daily  guaifenesin 100 mg/5 mL oral liquid, 200 mg= 10 mL, Oral, q4hr, PRN  hydrochlorothiazide-lisinopril 25 mg-20 mg oral tablet, 1 tab(s), Oral, Daily, 3 refills  Norvasc 10 mg oral tablet, 10 mg= 1 tab(s), Oral, At Bedtime, 3 refills  paroxetine 20 mg oral tablet, 20 mg= 1 tab(s), Oral, Daily, 3 refills  traZODone 100 mg oral tablet, 100 mg= 1 tab(s), Oral, qPM,? ?Not Taking, Completed Rx  Allergies  aspirin?(Burning)  Social History  Abuse/Neglect  No, No, Yes, 09/12/2019  No, 07/29/2019  Alcohol - Denies Alcohol Use, 08/15/2014  Past, 04/28/2016  Employment/School  Unemployed, Highest education level: None., 06/15/2015  Exercise  Exercise frequency: Daily. Self assessment: Good condition. Exercise type: Walking., 01/12/2017  Home/Environment  Lives with Alone. Living situation: Home/Independent. Alcohol abuse in household: No. Substance abuse in household: No. Smoker in household: No. Injuries/Abuse/Neglect in household: No. Feels unsafe at home: No., 01/12/2017  Nutrition/Health  Type of diet: low sodium diet., 12/05/2018  Regular, 04/28/2016  Substance Use - Denies Substance Abuse, 08/15/2014  Current, Marijuana, 1-2 times per month, 01/11/2019  Tobacco - Denies Tobacco Use, 08/15/2014  Never (less than 100 in lifetime), No, 09/12/2019  Never (less than 100 in lifetime), No, 09/06/2019  Former smoker, quit more than 30 days ago, No, 07/29/2019  Never (less than 100 in lifetime), N/A, 06/05/2019  Never (less than 100 in lifetime), N/A, 04/15/2019  Family History  Hypertension.: Mother, Sister and Brother.  Primary malignant neoplasm of breast: Mother.  Primary malignant neoplasm of prostate: Father.  Thyroid disease.:  Mother.  Immunizations  Vaccine Date Status   influenza virus vaccine, inactivated 10/19/2018 Given   influenza virus vaccine, inactivated 10/11/2017 Given   influenza virus vaccine, inactivated 11/09/2016 Given   influenza virus vaccine, inactivated 12/01/2015 Given   pneumococcal 23-polyvalent vaccine 12/01/2015 Given   tetanus/diphth/pertuss (Tdap) adult/adol 08/13/2014 Recorded   pneumococcal 13-valent conjugate vaccine 08/13/2014 Recorded   influenza virus vaccine, inactivated 11/22/2013 Recorded   pneumococcal 23-polyvalent vaccine 12/10/2010 Recorded   hepatitis A adult vaccine 10/05/2010 Recorded   Health Maintenance  Health Maintenance  ???Pending?(in the next year)  ??? ??OverDue  ??? ? ? ?Diabetes Screening due??and every?  ??? ? ? ?Asthma Management-Spirometry due??08/01/18??and every 1??year(s)  ??? ??Due?  ??? ? ? ?Asthma Management-Asthma Education due??09/12/19??and every 6??month(s)  ??? ? ? ?Asthma Management-Wolff Peak Flow due??09/12/19??Variable frequency  ??? ? ? ?Asthma Management-Written Action Plan due??09/12/19??and every 6??month(s)  ??? ? ? ?Influenza Vaccine due??09/12/19??and every?  ??? ??Due In Future?  ??? ? ? ?Colorectal Screening not due until??10/19/19??and every 1??year(s)  ??? ? ? ?Alcohol Misuse Screening not due until??01/01/20??and every 1??year(s)  ??? ? ? ?Obesity Screening not due until??01/01/20??and every 1??year(s)  ??? ? ? ?ADL Screening not due until??06/05/20??and every 1??year(s)  ??? ? ? ?Blood Pressure Screening not due until??09/11/20??and every 1??year(s)  ??? ? ? ?Body Mass Index Check not due until??09/11/20??and every 1??year(s)  ??? ? ? ?Hypertension Management-Blood Pressure not due until??09/11/20??and every 1??year(s)  ??? ? ? ?Hypertension Management-BMP not due until??09/11/20??and every 1??year(s)  ???Satisfied?(in the past 1 year)  ??? ??Satisfied?  ??? ? ? ?ADL Screening on??06/05/19.??Satisfied by Mandi Serna LPN  ??? ? ? ?Alcohol Misuse  Screening on??06/05/19.??Satisfied by Mandi Serna LPN  ??? ? ? ?Asthma Management-Asthma Medication Prescribed on??06/05/19.??Satisfied by Mayuri Cortes  ??? ? ? ?Blood Pressure Screening on??09/12/19.??Satisfied by Kelsi Sandoval LPN  ??? ? ? ?Body Mass Index Check on??09/12/19.??Satisfied by Kelsi Sandoval LPN  ??? ? ? ?Breast Cancer Screening on??06/18/19.??Satisfied by Cami Carrillo  ??? ? ? ?Cervical Cancer Screening on??06/05/19.??Satisfied by Jessie Lauren.  ??? ? ? ?Colorectal Screening on??10/19/18.??Satisfied by Halley Tavarez  ??? ? ? ?Depression Screening on??09/12/19.??Satisfied by Kelsi Sandoval LPN  ??? ? ? ?Diabetes Screening on??09/12/19.??Satisfied by Michelle Chi  ??? ? ? ?Hypertension Management-BMP on??09/12/19.??Satisfied by Michelle Chi  ??? ? ? ?Influenza Vaccine on??10/19/18.??Satisfied by Prashant Martin  ??? ? ? ?Lipid Screening on??06/05/19.??Satisfied by Noris Barnhart  ??? ? ? ?Obesity Screening on??09/12/19.??Satisfied by Kelsi Sandoval LPN  ?  cervical pap 1/26/15, colpo 3/20/15, 1/15/16 PAP [facility], colpo 3/16, repeat PAP 4/17 per GYN, 10/2017 [facility] colpo clinic 12/8/17, 6/5/19, 9/19 colpo  mammogram 3/13/15, 4/16 cat 2 benign, 8/17 cat 1 benign, 6/17 cat 2 benign  anal pap 12/1/15 ASCUS, 10/17 NIL, 6/5/19 neg  anal ct/gc 12/1/15 neg, 10/17 neg, 6/5/19 neg  oral ct/gc 12/1/15 neg, 10/17 neg, 6/5/19 neg  cervical/vag ct/gc 1/15/16 neg, 10/17 neg, 6/5/19 neg  ophth 2/18/16, 9/19  Dexa 2/15 NL , 6/19 -1.3  FOBT neg 10/18  Lab Results  Test Name Test Result Date/Time Comments   Creatinine 1.00 mg/dL 04/15/2019 14:45 CDT    eGFR-AA 74 mL/min (Low) 04/15/2019 14:45 CDT    AST 27 unit/L 04/15/2019 14:45 CDT    ALT 17 unit/L 04/15/2019 14:45 CDT    Hgb A1c 6.0 % 06/05/2019 09:15 CDT    Chol 157 mg/dL 06/05/2019 09:15 CDT    HDL 44 mg/dL 06/05/2019 09:15 CDT    Trig 89 mg/dL 06/05/2019 09:15 CDT    LDL 95 mg/dL 06/05/2019  09:15 CDT    Chol/HDL 3.6 06/05/2019 09:15 CDT    TSH 0.643 mIU/L 06/05/2019 09:15 CDT    WBC 3.4 x10(3)/mcL (Low) 06/05/2019 09:15 CDT    Hgb 13.6 gm/dL 06/05/2019 09:15 CDT    Hct 42.5 % 06/05/2019 09:15 CDT    Platelet 144 x10(3)/mcL 06/05/2019 09:15 CDT    CD4 Pct 8.5 % (Low) 06/05/2019 09:15 CDT    CD4 Absolute 127 unit/L (Low) 06/05/2019 09:15 CDT    Syphilis Ab Nonreactive 06/05/2019 09:15 CDT    Hep A Ab Reactive (Abnormal) 06/05/2019 09:15 CDT    Hep B Core IgM Nonreactive 06/05/2019 09:15 CDT    Hep Bs Ab Nonreactive 06/05/2019 09:15 CDT Non-Immune.   Hep Bs Ag Negative 06/05/2019 09:15 CDT    Hep C Ab Nonreactive 06/05/2019 09:15 CDT    Clue Cells NEG 06/05/2019 07:55 CDT    Trichomonas NEG 06/05/2019 07:55 CDT    WBC. NEG 06/05/2019 07:55 CDT    Yeast. NEG 06/05/2019 07:55 CDT    Chlam trach PCR NOT DETECTED. 06/05/2019 07:55 CDT    N. gonorrhea PCR NOT DETECTED. 06/05/2019 07:55 CDT    Chl Trach-Rectal-LC Negative 06/05/2019 09:47 CDT ?  This test was developed and its performance characteristics  determined by [facility]. It has not been cleared or  approved by the Food and Drug Administration.   N gonor-Rectal-LC Negative 06/05/2019 09:47 CDT ?  This test was developed and its performance characteristics  determined by [facility]. It has not been cleared or  approved by the Food and Drug Administration.  Performed At: [address] [id number]  Antoine Quinonez MD Ph:[phone]   Chl Ksfak-Azdbby-QB Negative 06/05/2019 09:47 CDT ?  This test was developed and its performance characteristics  determined by [facility]. It has not been cleared or  approved by the Food and Drug Administration.   N dftbb-Ppluvl-FC Negative 06/05/2019 09:47 CDT ?  This test was developed and its performance characteristics  determined by [facility]. It has not been cleared or  approved by the Food and Drug Administration.  Performed At: [address]  [id number]  Antoine Quinonez MD Ph:[phone]   HIV1 RNA PCR-LC [id number] cpy/mL 06/05/2019 09:15 CDT ?  The reportable range for this assay is 20 to 10,000,000  copies HIV-1 RNA/mL.   Panel A Spot Count 0 06/05/2019 09:15 CDT    Panel B Spot Count 0 06/05/2019 09:15 CDT

## 2022-09-16 ENCOUNTER — HISTORICAL (OUTPATIENT)
Dept: ADMINISTRATIVE | Facility: HOSPITAL | Age: 62
End: 2022-09-16

## 2023-06-06 ENCOUNTER — DOCUMENTATION ONLY (OUTPATIENT)
Dept: ADMINISTRATIVE | Facility: HOSPITAL | Age: 63
End: 2023-06-06